# Patient Record
Sex: MALE | Race: WHITE | NOT HISPANIC OR LATINO | Employment: OTHER | URBAN - METROPOLITAN AREA
[De-identification: names, ages, dates, MRNs, and addresses within clinical notes are randomized per-mention and may not be internally consistent; named-entity substitution may affect disease eponyms.]

---

## 2017-08-21 ENCOUNTER — HISTORICAL (OUTPATIENT)
Dept: ADMINISTRATIVE | Facility: HOSPITAL | Age: 72
End: 2017-08-21

## 2017-08-21 ENCOUNTER — HISTORICAL (OUTPATIENT)
Dept: RADIOLOGY | Facility: HOSPITAL | Age: 72
End: 2017-08-21

## 2017-08-21 LAB
ABS NEUT (OLG): 3.76 X10(3)/MCL (ref 2.1–9.2)
ALBUMIN SERPL-MCNC: 4.3 GM/DL (ref 3.4–5)
ALBUMIN/GLOB SERPL: 1.5 {RATIO}
ALP SERPL-CCNC: 62 UNIT/L (ref 50–136)
ALT SERPL-CCNC: 18 UNIT/L (ref 12–78)
APPEARANCE, UA: CLEAR
APTT PPP: 37.4 SECOND(S) (ref 20.6–36)
AST SERPL-CCNC: 12 UNIT/L (ref 15–37)
BACTERIA SPEC CULT: NORMAL /HPF
BASOPHILS # BLD AUTO: 0.1 X10(3)/MCL (ref 0–0.2)
BASOPHILS NFR BLD AUTO: 1 %
BILIRUB SERPL-MCNC: 0.8 MG/DL (ref 0.2–1)
BILIRUB UR QL STRIP: NEGATIVE
BILIRUBIN DIRECT+TOT PNL SERPL-MCNC: 0.1 MG/DL (ref 0–0.2)
BILIRUBIN DIRECT+TOT PNL SERPL-MCNC: 0.7 MG/DL (ref 0–0.8)
BUN SERPL-MCNC: 18 MG/DL (ref 7–18)
CALCIUM SERPL-MCNC: 8.7 MG/DL (ref 8.5–10.1)
CHLORIDE SERPL-SCNC: 105 MMOL/L (ref 98–107)
CO2 SERPL-SCNC: 27 MMOL/L (ref 21–32)
COLOR UR: YELLOW
CREAT SERPL-MCNC: 0.81 MG/DL (ref 0.7–1.3)
EOSINOPHIL # BLD AUTO: 0 X10(3)/MCL (ref 0–0.9)
EOSINOPHIL NFR BLD AUTO: 1 %
ERYTHROCYTE [DISTWIDTH] IN BLOOD BY AUTOMATED COUNT: 12.8 % (ref 11.5–17)
GLOBULIN SER-MCNC: 2.8 GM/DL (ref 2.4–3.5)
GLUCOSE (UA): NEGATIVE
GLUCOSE SERPL-MCNC: 99 MG/DL (ref 74–106)
HCT VFR BLD AUTO: 45 % (ref 42–52)
HGB BLD-MCNC: 15.5 GM/DL (ref 14–18)
HGB UR QL STRIP: NEGATIVE
INR PPP: 1.17 (ref 0–1.27)
KETONES UR QL STRIP: NEGATIVE
LEUKOCYTE ESTERASE UR QL STRIP: NEGATIVE
LYMPHOCYTES # BLD AUTO: 2 X10(3)/MCL (ref 0.6–4.6)
LYMPHOCYTES NFR BLD AUTO: 31 %
MCH RBC QN AUTO: 30.8 PG (ref 27–31)
MCHC RBC AUTO-ENTMCNC: 34.4 GM/DL (ref 33–36)
MCV RBC AUTO: 89.3 FL (ref 80–94)
MONOCYTES # BLD AUTO: 0.6 X10(3)/MCL (ref 0.1–1.3)
MONOCYTES NFR BLD AUTO: 9 %
NEUTROPHILS # BLD AUTO: 3.76 X10(3)/MCL (ref 1.4–7.9)
NEUTROPHILS NFR BLD AUTO: 58 %
NITRITE UR QL STRIP: NEGATIVE
PH UR STRIP: 5 [PH] (ref 5–9)
PLATELET # BLD AUTO: 153 X10(3)/MCL (ref 130–400)
PMV BLD AUTO: 10.2 FL (ref 9.4–12.4)
POTASSIUM SERPL-SCNC: 4.4 MMOL/L (ref 3.5–5.1)
PROT SERPL-MCNC: 7.1 GM/DL (ref 6.4–8.2)
PROT UR QL STRIP: NEGATIVE
PROTHROMBIN TIME: 14.7 SECOND(S) (ref 12.1–14.2)
RBC # BLD AUTO: 5.04 X10(6)/MCL (ref 4.7–6.1)
RBC #/AREA URNS HPF: NORMAL /[HPF]
SODIUM SERPL-SCNC: 137 MMOL/L (ref 136–145)
SP GR UR STRIP: 1.02 (ref 1–1.03)
SQUAMOUS EPITHELIAL, UA: NORMAL
TRANSFERRIN SERPL-MCNC: 273 MG/DL (ref 200–360)
UROBILINOGEN UR STRIP-ACNC: 0.2
WBC # SPEC AUTO: 6.4 X10(3)/MCL (ref 4.5–11.5)
WBC #/AREA URNS HPF: NORMAL /HPF

## 2017-09-25 ENCOUNTER — HISTORICAL (OUTPATIENT)
Dept: ADMINISTRATIVE | Facility: HOSPITAL | Age: 72
End: 2017-09-25

## 2017-11-13 ENCOUNTER — HISTORICAL (OUTPATIENT)
Dept: ADMINISTRATIVE | Facility: HOSPITAL | Age: 72
End: 2017-11-13

## 2018-03-07 ENCOUNTER — HISTORICAL (OUTPATIENT)
Dept: ADMINISTRATIVE | Facility: HOSPITAL | Age: 73
End: 2018-03-07

## 2018-10-03 ENCOUNTER — HISTORICAL (OUTPATIENT)
Dept: ADMINISTRATIVE | Facility: HOSPITAL | Age: 73
End: 2018-10-03

## 2018-10-17 LAB
ABS NEUT (OLG): 3.89 X10(3)/MCL (ref 2.1–9.2)
BASOPHILS # BLD AUTO: 0 X10(3)/MCL (ref 0–0.2)
BASOPHILS NFR BLD AUTO: 1 %
BUN SERPL-MCNC: 16 MG/DL (ref 7–18)
CALCIUM SERPL-MCNC: 8.8 MG/DL (ref 8.5–10.1)
CHLORIDE SERPL-SCNC: 111 MMOL/L (ref 98–107)
CO2 SERPL-SCNC: 25 MMOL/L (ref 21–32)
CREAT SERPL-MCNC: 0.86 MG/DL (ref 0.7–1.3)
CREAT/UREA NIT SERPL: 18.6
EOSINOPHIL # BLD AUTO: 0 X10(3)/MCL (ref 0–0.9)
EOSINOPHIL NFR BLD AUTO: 1 %
ERYTHROCYTE [DISTWIDTH] IN BLOOD BY AUTOMATED COUNT: 12.9 % (ref 11.5–17)
GLUCOSE SERPL-MCNC: 97 MG/DL (ref 74–106)
HCT VFR BLD AUTO: 48.9 % (ref 42–52)
HGB BLD-MCNC: 16.3 GM/DL (ref 14–18)
LYMPHOCYTES # BLD AUTO: 2 X10(3)/MCL (ref 0.6–4.6)
LYMPHOCYTES NFR BLD AUTO: 31 %
MCH RBC QN AUTO: 29.4 PG (ref 27–31)
MCHC RBC AUTO-ENTMCNC: 33.3 GM/DL (ref 33–36)
MCV RBC AUTO: 88.3 FL (ref 80–94)
MONOCYTES # BLD AUTO: 0.6 X10(3)/MCL (ref 0.1–1.3)
MONOCYTES NFR BLD AUTO: 8 %
NEUTROPHILS # BLD AUTO: 3.89 X10(3)/MCL (ref 2.1–9.2)
NEUTROPHILS NFR BLD AUTO: 59 %
PLATELET # BLD AUTO: 179 X10(3)/MCL (ref 130–400)
PMV BLD AUTO: 9.9 FL (ref 9.4–12.4)
POTASSIUM SERPL-SCNC: 4.3 MMOL/L (ref 3.5–5.1)
RBC # BLD AUTO: 5.54 X10(6)/MCL (ref 4.7–6.1)
SODIUM SERPL-SCNC: 142 MMOL/L (ref 136–145)
WBC # SPEC AUTO: 6.6 X10(3)/MCL (ref 4.5–11.5)

## 2018-10-25 ENCOUNTER — HISTORICAL (OUTPATIENT)
Dept: SURGERY | Facility: HOSPITAL | Age: 73
End: 2018-10-25

## 2020-01-22 ENCOUNTER — HISTORICAL (OUTPATIENT)
Dept: ADMINISTRATIVE | Facility: HOSPITAL | Age: 75
End: 2020-01-22

## 2020-03-03 ENCOUNTER — HISTORICAL (OUTPATIENT)
Dept: PREADMISSION TESTING | Facility: HOSPITAL | Age: 75
End: 2020-03-03

## 2020-03-03 LAB
ABS NEUT (OLG): 5.68 X10(3)/MCL (ref 2.1–9.2)
ALBUMIN SERPL-MCNC: 4.1 GM/DL (ref 3.4–5)
ALBUMIN/GLOB SERPL: 1.3 {RATIO}
ALP SERPL-CCNC: 84 UNIT/L (ref 50–136)
ALT SERPL-CCNC: 25 UNIT/L (ref 12–78)
AST SERPL-CCNC: 19 UNIT/L (ref 15–37)
BASOPHILS # BLD AUTO: 0.1 X10(3)/MCL (ref 0–0.2)
BASOPHILS NFR BLD AUTO: 1 %
BILIRUB SERPL-MCNC: 0.4 MG/DL (ref 0.2–1)
BILIRUBIN DIRECT+TOT PNL SERPL-MCNC: 0.1 MG/DL (ref 0–0.2)
BILIRUBIN DIRECT+TOT PNL SERPL-MCNC: 0.3 MG/DL (ref 0–0.8)
BUN SERPL-MCNC: 19 MG/DL (ref 7–18)
CALCIUM SERPL-MCNC: 9.1 MG/DL (ref 8.5–10.1)
CHLORIDE SERPL-SCNC: 104 MMOL/L (ref 98–107)
CO2 SERPL-SCNC: 27 MMOL/L (ref 21–32)
CREAT SERPL-MCNC: 0.79 MG/DL (ref 0.7–1.3)
EOSINOPHIL # BLD AUTO: 0 X10(3)/MCL (ref 0–0.9)
EOSINOPHIL NFR BLD AUTO: 0 %
ERYTHROCYTE [DISTWIDTH] IN BLOOD BY AUTOMATED COUNT: 12.8 % (ref 11.5–17)
GLOBULIN SER-MCNC: 3.2 GM/DL (ref 2.4–3.5)
GLUCOSE SERPL-MCNC: 86 MG/DL (ref 74–106)
HCT VFR BLD AUTO: 45.6 % (ref 42–52)
HGB BLD-MCNC: 15 GM/DL (ref 14–18)
LYMPHOCYTES # BLD AUTO: 0.9 X10(3)/MCL (ref 0.6–4.6)
LYMPHOCYTES NFR BLD AUTO: 12 %
MCH RBC QN AUTO: 29.1 PG (ref 27–31)
MCHC RBC AUTO-ENTMCNC: 32.9 GM/DL (ref 33–36)
MCV RBC AUTO: 88.5 FL (ref 80–94)
MONOCYTES # BLD AUTO: 1 X10(3)/MCL (ref 0.1–1.3)
MONOCYTES NFR BLD AUTO: 13 %
NEUTROPHILS # BLD AUTO: 5.68 X10(3)/MCL (ref 2.1–9.2)
NEUTROPHILS NFR BLD AUTO: 73 %
PLATELET # BLD AUTO: 201 X10(3)/MCL (ref 130–400)
PMV BLD AUTO: 10.2 FL (ref 9.4–12.4)
POTASSIUM SERPL-SCNC: 4.2 MMOL/L (ref 3.5–5.1)
PROT SERPL-MCNC: 7.3 GM/DL (ref 6.4–8.2)
RBC # BLD AUTO: 5.15 X10(6)/MCL (ref 4.7–6.1)
SODIUM SERPL-SCNC: 136 MMOL/L (ref 136–145)
WBC # SPEC AUTO: 7.8 X10(3)/MCL (ref 4.5–11.5)

## 2020-03-18 ENCOUNTER — HISTORICAL (OUTPATIENT)
Dept: SURGERY | Facility: HOSPITAL | Age: 75
End: 2020-03-18

## 2021-06-14 ENCOUNTER — HISTORICAL (OUTPATIENT)
Dept: ADMINISTRATIVE | Facility: HOSPITAL | Age: 76
End: 2021-06-14

## 2021-07-06 ENCOUNTER — HISTORICAL (OUTPATIENT)
Dept: SURGERY | Facility: HOSPITAL | Age: 76
End: 2021-07-06

## 2021-09-13 ENCOUNTER — HISTORICAL (OUTPATIENT)
Dept: ADMINISTRATIVE | Facility: HOSPITAL | Age: 76
End: 2021-09-13

## 2022-04-11 ENCOUNTER — HISTORICAL (OUTPATIENT)
Dept: ADMINISTRATIVE | Facility: HOSPITAL | Age: 77
End: 2022-04-11

## 2022-04-27 VITALS
DIASTOLIC BLOOD PRESSURE: 73 MMHG | SYSTOLIC BLOOD PRESSURE: 125 MMHG | BODY MASS INDEX: 28.37 KG/M2 | WEIGHT: 202.63 LBS | HEIGHT: 71 IN

## 2022-04-30 NOTE — OP NOTE
DATE OF SURGERY:    07/06/2021    SURGEON:  Sky Barnett MD  ASSISTANT:  JOHNATHAN Henderson    ASSISTANT ATTESTATION:  Haider Castro, certified physician assistant, was necessary and essential for all aspects of the operation including patient positioning, surgical exposure, exposure of the transverse carpal ligament and retraction that protected the median nerve, and wound closure.    PREOPERATIVE DIAGNOSIS:  Right hand carpal tunnel syndrome.    POSTOPERATIVE DIAGNOSIS:  Right hand carpal tunnel syndrome.    PROCEDURE:  Right hand carpal tunnel release.    COUNTS:  Lap, needle, and sponge count are correct.    COMPLICATIONS:  None.    BLOOD LOSS:  None.    INDICATIONS:  Mr. Rico is 75 years old with carpal tunnel syndrome of his right hand.  He had failed nonoperative treatment.  Risks, benefits, alternatives, and complications of operative and nonoperative treatment were explained.  He understood, agreed, and wanted to proceed with operative intervention.  Valid informed consent was obtained.    DESCRIPTION OF PROCEDURE:  He was brought to the operating room, placed supine on the operating room table, and underwent regional anesthesia and sedation.  A tourniquet was placed on the right arm.  The usual sterile ChloraPrep scrub and paint followed by sterile draping was performed.  Ioban dressing was placed.  Esmarch bandage was used to exsanguinate the right upper extremity.  Tourniquet was inflated.  Incision was made adjacent to the thenar crease.  Superficial palmar fascia was incised in line with the skin incision.  Skin bleeders were coagulated with cautery.  A self-retaining retractor was placed along with right angle retractors at the proximal and distal end of the skin incision to expose the transverse carpal ligament.  I then released the transverse carpal ligament along its ulnar border in line with the skin incision.  I then released the distal extent of the deep forearm fascia for a complete  release of the carpal tunnel.  The median nerve was intact.  There were no masses or lesions within the carpal tunnel.  The tourniquet was deflated.  Hemostasis was obtained.  There was no abnormal bleeding.  The wound was     irrigated, suctioned, closed with nylon sutures.  Sterile dressings were applied.  Patient was taken to the recovery room in stable condition.        ______________________________  MD GARBIEL Adrian/JIL  DD:  07/06/2021  Time:  09:06AM  DT:  07/06/2021  Time:  09:21AM  Job #:  [id number]

## 2022-04-30 NOTE — OP NOTE
Patient:   Brown Rico Jr             MRN: [id number]            FIN: [id number]               Age:   74 years     Sex:  Male     :  1945   Associated Diagnoses:   Cholelithiasis; Calculous cholecystitis   Author:   Kelvin Silverman MD      Operative Note   Operative Information   Date/ Time:  3/18/2020 08:06:00.     Procedures Performed.     Indications.     Preoperative Diagnosis: Cholelithiasis (MNX86-ID K80.20), Calculous cholecystitis (WWQ10-XH K80.10).     Postoperative Diagnosis: Cholelithiasis (OAX70-MS K80.20), Calculous cholecystitis (VVR38-LX K80.10).     Surgeon: Kelvin Silverman MD.     Assistant: Helen Ellis     Anesthesia: Gen..     Speciman Removed: gallbladder.     Description of Procedure/Findings/    Complications:     The patient was taken to the operating room. Patient was placed under general anesthesia. Abdomen was prepped and draped in the usual sterile fashion. An appropriate timeout was performed. Optical tipped trocar was used to access the peritoneal cavity. Pneumoperitoneum was instituted. Abdominal exploration was negative. Gallbladder was noted and held in a cephalad direction. The infundibulum was noted and held in a lateral direction. The peritoneum overlying the infundibulum was dissected revealing the cystic duct gallbladder junction and the cystic artery. The critical view was obtained. The cystic duct and cystic artery were clipped and transected. The gallbladder was removed from the undersurface of the liver with sharp and electric dissection. Hemostasis was assured. The clips were in excellent position and there was no bleeding or leakage of bile. Gallbladder was completely removed from the liver hemostasis was assured. The gallbladder was removed from the abdomen. Once again hemostasis was assured the operative site was irrigated until clear. Trochars were removed and pneumoperitoneum released the incisions were closed with Vicryl..     Esimated  blood loss: loss less than  15  cc.     Complications: None.

## 2022-04-30 NOTE — OP NOTE
DATE OF SURGERY:    10/25/2018    SURGEON:  Sky Barnett MD  ASSISTANT:  JOHNATHAN Henderson    PREOPERATIVE DIAGNOSIS:  Left hand carpal tunnel syndrome.    POSTOPERATIVE DIAGNOSIS:  Left hand carpal tunnel syndrome.    PROCEDURE:  Left hand carpal tunnel release.    COUNTS:  Lap, needle, and sponge counts were correct.    COMPLICATIONS:  None.    BLOOD LOSS:  None.    INDICATIONS FOR PROCEDURE:  Mr. Rico has left hand carpal tunnel syndrome and elected to undergo a left hand carpal tunnel release after failed nonoperative treatment.  Risks, benefits, alternatives and complications of operative and nonoperative treatment were explained.  He understood, agreed and wanted to proceed with operative intervention.  Valid informed consent was obtained.    PROCEDURE IN DETAIL:  He was brought to the operating room and placed supine on the operating table and underwent general anesthesia.  He was positioned with the left arm on a hand table, followed by placement of a tourniquet.  The left arm tourniquet was positioned, and the usual sterile ChloraPrep scrub and paint followed by sterile draping was performed.  Ioban dressing was placed.  Esmarch bandage was used to exsanguinate the left upper extremity.  Tourniquet was inflated.  Incision was made adjacent to the thenar crease.  The skin was __________ with cautery.  The superficial palmar fascia was incised along the same line as the skin incision.  The transverse carpal ligament was exposed and then incised along its ulnar border.  I then released the distal extent of the deep forearm fascia in line with the incised transverse carpal ligament.  After complete release of the carpal tunnel, the median nerve was inspected and found to be intact.  There were no masses or lesions within the carpal tunnel.  The tourniquet was deflated.  Hemostasis was obtained.  There was no abnormal bleeding.  The wound was irrigated, suctioned and closed with     nylon sutures.   Sterile dressings were applied.  The patient was taken to the recovery room in stable condition.        ______________________________  MD GABRIEL Adrian/SK  DD:  10/25/2018  Time:  02:16PM  DT:  10/25/2018  Time:  02:47PM  Job #:  [id number]

## 2022-05-05 NOTE — HISTORICAL [facility] CERNER
This is a historical note converted from [place]. Formatting and pictures may have been removed.  Please reference Claudia for original formatting and attached multimedia. Chief Complaint  PT C/O BILATERAL WRIST PAIN. PT STATED HIS HANDS GET NUMB. RIGHT WRIST > LEFT.  History of Present Illness  Patient complains of bilateral wrist and hand pain that wakes him up at night.? As it worsens?he loses dexterity.? He has had worsening weakness?with  in both hands.? He complains of numbness in the thumb, index, long, and occasionally?the ring finger of both hands.  Review of Systems  Systemic: No fever, no chills, and no recent weight change.  Head: No headache - frequent.  Eyes: No vision problems.  Otolarnygeal: No hearing loss, no earache, no epistaxis, no hoarseness, and no tooth pain. Gums normal.  Cardiovascular: No chest pain or discomfort and no palpitations.  Pulmonary: No pulmonary symptoms - difficulty sleeping, no dyspnea, and cough not worse in the morning.  Gastrointestinal: Appetite not decreased. No dysphagia and no constant eructation. No nausea, no vomiting, no abdominal pain, no hematochezia, and no loose/mushy stools - frequent. No constipation - frequent.  Genitourinary: No genitourinary symptoms - Getting up every night to urinate and no increase in urinary frequency. No urinary hesitancy. No urinary loss of control - difficulty stopping urination and no burning sensation during urination.  Musculoskeletal: No calf muscle cramps and no localized soft tissue swelling of the ankle.  Neurological: No fainting and no convulsions.  Psychological: Not feeling nervous tension, not feeling nervous from exhaustion, and no depression.  Skin: No rash. Previous history of no ulcers.  ?  Physical Exam  Vitals & Measurements  BP:?137/80?  HT:?180?cm? HT:?180?cm? WT:?94.80?kg? WT:?94.80?kg? BMI:?29.26?  Bilateral hand and wrist exam:  Full range of motion of all 5 fingers in both hands  Thenar atrophy present in  both hands  No hypo-thenar atrophy  Positive Phalens test  Positive carpal compression test  No swelling, redness, no increased heat  Decreased light touch sensation in the thumb, index, and long fingers  Normal sensation?in the ring and?little fingers on exam today  2+ radial pulses  Radiographs of?right and left wrist?show no gross evidence of arthritic change. ?No evidence of fracture or dislocation.? Mild degenerative changes in the carpus.  Assessment/Plan  1.?Right carpal tunnel syndrome  ? Discussed carpal tunnel release  Patient will call if he wants to schedule.  Ordered:  Office/Outpatient Visit Level 3 Established [id number] PC, Right carpal tunnel syndrome  Left carpal tunnel syndrome, [facility], 03/07/18 15:04:00 CST  ?  2.?Left carpal tunnel syndrome  Ordered:  Office/Outpatient Visit Level 3 Established [id number] PC, Right carpal tunnel syndrome  Left carpal tunnel syndrome, [facility], 03/07/18 15:04:00 CST  ?  Bilateral wrist pain  ?  Orders:  Clinic Follow-up PRN, 03/07/18 15:04:00 CST, Future Order, [facility]   Problem List/Past Medical History  Ongoing  Afib  High cholesterol  Hypertension  Irregular heart beat  Primary osteoarthritis of right knee  Right carpal tunnel syndrome  S/P left unicompartmental knee replacement  S/P right unicompartmental knee replacement  Thyroid  Historical  ATRIAL FIBRILLATION  Benign hypertrophy of prostate  Hypothyroid  Procedure/Surgical History  [facility] Partial Knee Arthroplasty Bilateral (Bilateral) (09/12/2017), Replacement of Left Knee Joint with Unicondylar Synthetic Substitute, Cemented, Open Approach (09/12/2017), Replacement of Right Knee Joint with Unicondylar Synthetic Substitute, Cemented, Open Approach (09/12/2017), Robotic Assisted Procedure of Lower Extremity, Open Approach (09/12/2017), Colonoscopy, Kidney.  Medications  amiodarone 200 mg oral Tab, 100 mg, Oral, Daily  aspirin 81 mg oral Delayed Release (EC) tablet,  81 mg= 1 tab(s), Oral, Daily  Co Q-10, 100 mg, Oral, Daily  Colace 100 mg oral capsule, 200 mg= 2 cap(s), Oral, Daily  finasteride 5 mg oral tablet, 5 mg= 1 tab(s), Oral, Daily  levothyroxine 150 mcg (0.15 mg) oral tablet, 150 mcg= 1 tab(s), Oral, Daily  metoprolol succinate 25 mg oral tablet extended release, 25 mg= 1 tab(s), Oral, At Bedtime  metoprolol SUCCINATE 25 mg oral tablet, EXTENDED RELEASE, 1/2 tab, Oral, Daily  PRADAXA 150 MG CAPSULE, 150 mg= 1 cap(s), Oral, BID  pravastatin 40 mg oral tablet, 1 tab, Oral, At Bedtime  SULFAMETH//160MG TB, Oral, BID  tamsulosin 0.4 mg oral capsule, 0.4 mg= 1 cap(s), Oral, Daily  Taztia  mg/24 hours oral capsule, extended release, 240 mg= 1 cap(s), Oral, Daily  Valium 5 mg oral tablet, 5 mg= 1 tab(s), Oral, q6hr, PRN  Vitamin D3 2000 intl units oral capsule, 2000 IntUnit= 1 cap(s), Oral, Daily  vitamin E 1000 intl units oral capsule, 1000 IntUnit= 1 cap(s), Oral, Daily  Allergies  No Known Allergies  Social History  Alcohol - Denies Alcohol Use, 10/12/2013  Never, 08/07/2017  Home/Environment  Lives with Spouse., 09/25/2017  Substance Abuse - Denies Substance Abuse, 10/12/2013  Never, 08/07/2017  Tobacco - Denies Tobacco Use, 10/12/2013  Never smoker, 08/07/2017  Family History  Cancer: Father.  Heart: Father.  Hypertension.: Father.

## 2022-05-05 NOTE — HISTORICAL [facility] CERNER
This is a historical note converted from Claudia. Formatting and pictures may have been removed.  Please reference [place] for original formatting and attached multimedia. Chief Complaint  Patient presents for left knne pain. Reports having intermitten burning behind his left knee. Also reports having intermitten numbness in his fingers, with pain that readiates up towards his shoulder.  History of Present Illness  75-year-old male presents office today for evaluation of his left knee and right?wrist pain.?His left knee pain has been bothering him for couple of months. His pain is noted?through the posterior thigh. He denies any?joint pain. Denies any swelling.?This is?worse when getting from a seated to a standing position. Denies any anterior knee pain.?History of bilateral?medial compartment partial knee arthroplasties in 2017.?Is also complaining of some numbness and tingling to the right?thumb index and long finger.?This is worse at night. He has seen some improvement with a carpal tunnel brace. He has a history of left carpal tunnel release in 2018. Denies any neck pain  Review of Systems  Systemic: No fever, no chills, and no recent weight change.  Head: No headache - frequent.  Eyes: No vision problems.  Otolarnygeal: No hearing loss, no earache, no epistaxis, no hoarseness, and no tooth pain. Gums normal.  Cardiovascular: No chest pain or discomfort and no palpitations.  Pulmonary: No pulmonary symptoms - no dyspnea, no shortness of breath  Gastrointestinal: Appetite not decreased. No dysphagia and no constant eructation. No nausea, no vomiting, no abdominal pain, no hematochezia.  Genitourinary: No genitourinary symptoms - No urinary hesitancy. No urinary loss of control - no burning sensation during urination.  Musculoskeletal: No calf muscle cramps and no localized soft tissue swelling  Neurological: No fainting and no convulsions.  Psychological: no depression.  Skin: No rash.  Physical Exam  Vitals &  Measurements  T:?97.4? ?F (Oral)? HR:?59(Peripheral)? BP:?136/82? WT:?94.430?kg? BMI:?29.15?  General exam:  Well-groomed,?well-nourished, no acute distress  Alert and oriented ?3  HEENT: PERRLA,?normocephalic, atraumatic  Cardiovascular: S1 and S2 heard,?no murmurs  Pulmonary: Lungs clear to auscultation?bilaterally  Gastrointestinal: Positive bowel sounds,?soft, nontender  ?   Cardiovascular:  Arterial Pulses: ? Dorsalis pedis pulses were normal.  Musculoskeletal System:  left Knee:  General: ? No swelling of the knee. ? No warmth of the knee. ? No pain was elicited by motion of the knee. ? No instability of the knee. ? Knees demonstrated no muscle weakness.  tender over mid hamstring over biceps femoris  left?Knee: ? Motion was normal.  Active flexion?120 degrees  Active extension 0 degrees  Neurological:  Sensation: ? Monofilament wire test of the leg/foot was normal.  Motor (Strength): ? No weakness of the?left knee was observed.  Skin:  ? No cellulitis. Surgical incision well healed ?  Tests  Imaging:  X-Ray Knee:  A complete knee x-ray with standing views was performed -of?left knee.  Impressions Radiology Test  X-ray of knee was performed intact?left knee implant.?No signs of loosening or subsidence  ?   right wrist exam:  No swelling or erythema  Positive carpal compression test  Positive Tinels sign  Full range of motion?through all digits as well as the wrist  Sensation intact distally  Radial pulse 2+  Assessment/Plan  1.?History of unicondylar arthroplasty of left knee?Z96.652  ?Recommend to physical therapy for hamstring?tightness. Follow-up with us as needed  Ordered:  Clinic Follow-up PRN, 06/14/21 9:33:00 CDT, Future Order, [facility]  ?  2.?Hamstring tightness of left lower extremity?M62.89  ?Physical?therapy  Ordered:  Clinic Follow-up PRN, 06/14/21 9:33:00 CDT, Future Order, [facility]  ?  3.?Right carpal tunnel syndrome?G56.01  ?Right hand carpal tunnel release  The risks, benefits, and  alternatives to surgery were discussed with the patient and family and they wish to proceed with the operation.  Dr. Cortés has examined the patient and agrees with plan  Ordered:  Clinic Follow-up PRN, 06/14/21 9:33:00 CDT, Future Order, [facility]  ?   Problem List/Past Medical History  Ongoing  Afib  BPH - benign prostatic hyperplasia  Gallstone  GERD - Gastro-esophageal reflux disease  Hamstring tightness of left lower extremity  High cholesterol  History of unicondylar arthroplasty of left knee  Home CPAP unit  Hypertension  Irregular heart beat  Primary osteoarthritis of right knee  Right carpal tunnel syndrome  S/P carpal tunnel release  S/P left unicompartmental knee replacement  S/P right unicompartmental knee replacement  Sleep apnea  Thyroid  Historical  ATRIAL FIBRILLATION  Benign hypertrophy of prostate  Hypothyroid  Procedure/Surgical History  Ablation (08/24/2020)  Cholecystectomy Laparoscopic (None) (03/18/2020)  Laparoscopy, surgical; cholecystectomy (03/18/2020)  Resection of Gallbladder, Percutaneous Endoscopic Approach (03/18/2020)  Carpal Tunnel Release (Left) (10/25/2018)  Neuroplasty and/or transposition; median nerve at carpal tunnel (10/25/2018)  Release Median Nerve, Open Approach (10/25/2018)  [facility] Partial Knee Arthroplasty Bilateral (Bilateral) (09/12/2017)  Replacement of Left Knee Joint with Unicondylar Synthetic Substitute, Cemented, Open Approach (09/12/2017)  Replacement of Right Knee Joint with Unicondylar Synthetic Substitute, Cemented, Open Approach (09/12/2017)  Robotic Assisted Procedure of Lower Extremity, Open Approach (09/12/2017)  angiogram  Colonoscopy  hemorrhoidectomy  Kidney   Medications  Inpatient  Toradol, 30 mg= 1 mL, IV Push, Once  Home  diltiazem 240 mg/24 hours oral CAPsule, extended release, 240 mg= 1 cap(s), Oral, Daily  finasteride 5 mg oral tablet, 5 mg= 1 tab(s), Oral, Daily  flecainide 100 mg oral tablet, 100 mg= 1 tab(s), Oral, BID  levothyroxine  150 mcg (0.15 mg) oral tablet, 150 mcg= 1 tab(s), Oral, Daily  PRADAXA 150 MG CAPSULE, 150 mg= 1 cap(s), Oral, BID  pravastatin 40 mg oral tablet, 40 mg= 1 tab(s), Oral, At Bedtime  tamsulosin 0.4 mg oral capsule, 0.4 mg= 1 cap(s), Oral, Daily  Allergies  No Known Allergies  Social History  Abuse/Neglect  No, No, Yes, 06/14/2021  Alcohol - Denies Alcohol Use, 10/12/2013  Past, 06/14/2021  Home/Environment  Lives with Spouse., 09/25/2017  Spiritual/Cultural  Buddhism, 03/03/2020  Substance Use - Denies Substance Abuse, 10/12/2013  Never, 01/22/2020  Tobacco - Denies Tobacco Use, 10/12/2013  Never (less than 100 in lifetime), No, 06/14/2021  Family History  Cancer: Father.  Heart: Father.  Hypertension.: Father.

## 2022-05-05 NOTE — HISTORICAL [facility]
This is a historical note converted from Claudia. Formatting and pictures may have been removed.  Please reference Claudia for original formatting and attached multimedia. Chief Complaint  right CTS, ready to schedule surgery  History of Present Illness  Patient has numbness and pain?in his right hand.? The numbness wakes him up at night. ?He has pain and numbness present primarily in the thumb, index, and long fingers. ?He has no?little or ring finger?tenderness or numbness.  Review of Systems  Systemic: No fever, no chills, and no recent weight change.  Head: No headache - frequent.  Eyes: No vision problems.  Otolarnygeal: No hearing loss, no earache, no epistaxis, no hoarseness, and no tooth pain. Gums normal.  Cardiovascular: No chest pain or discomfort and no palpitations.  Pulmonary: No pulmonary symptoms - difficulty sleeping, no dyspnea, and cough not worse in the morning.  Gastrointestinal: Appetite not decreased. No dysphagia and no constant eructation. No nausea, no vomiting, , no hematochezia, and no loose/mushy stools - frequent. No constipation - frequent.? Patient has been having abdominal pain and is scheduled?next Thursday to see Dr. Potts.  Genitourinary: No genitourinary symptoms - Getting up every night to urinate and no increase in urinary frequency. No urinary hesitancy. No urinary loss of control - difficulty stopping urination and no burning sensation during urination.  Musculoskeletal: No calf muscle cramps and no localized soft tissue swelling of the ankle.  Neurological: No fainting and no convulsions.  Psychological: Not feeling nervous tension, not feeling nervous from exhaustion, and no depression.  Skin: No rash. Previous history of no ulcers.  Physical Exam  Vitals & Measurements  HR:?61(Peripheral)? BP:?138/82?  HT:?180?cm? WT:?91.1?kg? BMI:?28.12?  Right upper extremity;  No tenderness?of the ulnar groove or ulnar nerve  Decreased light touch sensation in the thumb, index, and long  fingers  2+ radial pulse  Full range of motion of all 5 fingers  Mild thenar atrophy  Positive Phalens?test  Positive carpal compression test  ?  Assessment/Plan  1.?Right carpal tunnel syndrome?G56.01  ?Patient is scheduled to see Dr. Potts for his abdominal pain next week.? He wants to schedule his right carpal tunnel release?from March?as long as everything is okay with his abdomen.  Ordered:  Office/Outpatient Visit Level 3 Established [id number] PC, Right carpal tunnel syndrome, [facility], 01/22/20 9:53:00 CST  ?  Orders:  Clinic Follow-up PRN, 01/22/20 9:53:00 CST, Future Order, [facility]  XR Hand Right Minimum 3 Views, Routine, 01/22/20 9:34:00 CST, Pain, None, Ambulatory, Patient Has IV?, Rad Type, Right hand pain, Not Scheduled, 01/22/20 9:34:00 CST  Referrals  Clinic Follow-up PRN, 01/22/20 9:53:00 CST, Future Order, [facility]   Problem List/Past Medical History  Ongoing  Afib  High cholesterol  Hypertension  Irregular heart beat  Primary osteoarthritis of right knee  Right carpal tunnel syndrome  S/P carpal tunnel release  S/P left unicompartmental knee replacement  S/P right unicompartmental knee replacement  Thyroid  Historical  ATRIAL FIBRILLATION  Benign hypertrophy of prostate  Hypothyroid  Procedure/Surgical History  Carpal Tunnel Release (Left) (10/25/2018)  Neuroplasty and/or transposition; median nerve at carpal tunnel (10/25/2018)  Release Median Nerve, Open Approach (10/25/2018)  [facility] Partial Knee Arthroplasty Bilateral (Bilateral) (09/12/2017)  Replacement of Left Knee Joint with Unicondylar Synthetic Substitute, Cemented, Open Approach (09/12/2017)  Replacement of Right Knee Joint with Unicondylar Synthetic Substitute, Cemented, Open Approach (09/12/2017)  Robotic Assisted Procedure of Lower Extremity, Open Approach (09/12/2017)  angiogram  Colonoscopy  Kidney   Medications  amiodarone 200 mg oral Tab, 100 mg, Oral, Daily,? ?Not Taking per Prescriber: Last Dose  Date/Time Unknown  aspirin 81 mg oral Delayed Release (EC) tablet, 81 mg= 1 tab(s), Oral, Daily,? ?Not Taking per Prescriber: Last Dose Date/Time Unknown  Co Q-10, 100 mg, Oral, Daily  Colace 100 mg oral capsule, 200 mg= 2 cap(s), Oral, Daily,? ?Not Taking per Prescriber: Last Dose Date/Time Unknown  finasteride 5 mg oral tablet, 5 mg= 1 tab(s), Oral, Daily  FLECAINIDE 50MG TABLETS  levothyroxine 150 mcg (0.15 mg) oral tablet, 150 mcg= 1 tab(s), Oral, Daily  metoprolol succinate 25 mg oral tablet extended release, 25 mg= 1 tab(s), Oral, At Bedtime,? ?Not Taking per Prescriber: Last Dose Date/Time Unknown  metoprolol SUCCINATE 25 mg oral tablet, EXTENDED RELEASE, 1/2 tab, Oral, Daily,? ?Not Taking per Prescriber: Last Dose Date/Time Unknown  PRADAXA 150 MG CAPSULE, 150 mg= 1 cap(s), Oral, BID  pravastatin 40 mg oral tablet, 1 tab, Oral, At Bedtime  SULFAMETH//160MG TB, Oral, BID  tamsulosin 0.4 mg oral capsule, 0.4 mg= 1 cap(s), Oral, Daily  Taztia  mg/24 hours oral capsule, extended release, 240 mg= 1 cap(s), Oral, Daily  Valium 5 mg oral tablet, 5 mg= 1 tab(s), Oral, q6hr, PRN,? ?Not Taking per Prescriber: Last Dose Date/Time Unknown  Vitamin D3 2000 intl units oral capsule, 2000 IntUnit= 1 cap(s), Oral, Daily  vitamin E 1000 intl units oral capsule, 1000 IntUnit= 1 cap(s), Oral, Daily  Allergies  No Known Allergies  Social History  Abuse/Neglect  No, 01/22/2020  Alcohol - Denies Alcohol Use, 10/12/2013  Never, 01/22/2020  Home/Environment  Lives with Spouse., 09/25/2017  Substance Use - Denies Substance Abuse, 10/12/2013  Never, 01/22/2020  Tobacco - Denies Tobacco Use, 10/12/2013  Never (less than 100 in lifetime), N/A, 01/22/2020  Family History  Cancer: Father.  Heart: Father.  Hypertension.: Father.  Health Maintenance  Health Maintenance  ???Pending?(in the next year)  ??? ??OverDue  ??? ? ? ?Aspirin Therapy for CVD Prevention due??09/13/18??and every 1??year(s)  ??? ? ? ?Hypertension  Management-BMP due??10/17/19??and every 1??year(s)  ??? ? ? ?Advance Directive due??01/01/20??and every 1??year(s)  ??? ? ? ?Geriatric Depression Screening due??01/01/20??and every 1??year(s)  ??? ??Due?  ??? ? ? ?Cognitive Screening due??01/01/20??and every 1??year(s)  ??? ? ? ?Functional Assessment due??01/01/20??and every 1??year(s)  ??? ? ? ?ADL Screening due??01/22/20??and every 1??year(s)  ??? ? ? ?Colorectal Screening (Senior Wellness) due??01/22/20??and every 10??year(s)  ??? ? ? ?Hypertension Management-Education due??01/22/20??and every 1??year(s)  ??? ? ? ?Pneumococcal Vaccine due??01/22/20??Variable frequency  ??? ? ? ?Pneumococcal Vaccine due??01/22/20??and every?  ??? ? ? ?Tetanus Vaccine due??01/22/20??and every 10??year(s)  ??? ? ? ?Zoster Vaccine due??01/22/20??and every 100??year(s)  ??? ??Due In Future?  ??? ? ? ?Alcohol Misuse Screening not due until??01/01/21??and every 1??year(s)  ??? ? ? ?Fall Risk Assessment not due until??01/01/21??and every 1??year(s)  ??? ? ? ?Obesity Screening not due until??01/01/21??and every 1??year(s)  ??? ? ? ?Hypertension Management-Blood Pressure not due until??01/21/21??and every 1??year(s)  ???Satisfied?(in the past 1 year)  ??? ??Satisfied?  ??? ? ? ?Alcohol Misuse Screening on??01/22/20.??Satisfied by Shirley Salgado  ??? ? ? ?Blood Pressure Screening on??01/22/20.??Satisfied by Shirley Salgado.  ??? ? ? ?Body Mass Index Check on??01/22/20.??Satisfied by Shirley Salgado  ??? ? ? ?Fall Risk Assessment on??01/22/20.??Satisfied by Shirley Salgado  ??? ? ? ?Hypertension Management-Blood Pressure on??01/22/20.??Satisfied by Shirley Salgado  ??? ? ? ?Obesity Screening on??01/22/20.??Satisfied by Shirley Salgado  ?

## 2022-05-05 NOTE — HISTORICAL [facility]
This is a historical note converted from [employer]. Formatting and pictures may have been removed.  Please reference Cerarianna for original formatting and attached multimedia. Chief Complaint  Patient presents for follow up right wrist pain and numbness, hx right CTR 7/6/21. Patient denies any pain at this time. Reports occasional pain and numbness. Mainly when gripping objects such as phone, wheel, etc.  History of Present Illness  76-year-old male presents office today for evaluation of his right?wrist pain and numbness into his long finger. ?He had a carpal tunnel release 2 months ago.? He also describes?pain radiating from the?shoulder down the arm. ?He has intermittent neck pain.??His symptoms worsen?with certain positions?such as flexion?of the elbow?for longer periods of time. ?He denies any history of injury  Review of Systems  Systemic: No fever, no chills, and no recent weight change.  Head: No headache - frequent.  Eyes: No vision problems.  Otolarnygeal: No hearing loss, no earache, no epistaxis, no hoarseness, and no tooth pain. Gums normal.  Cardiovascular: No chest pain or discomfort and no palpitations.  Pulmonary: No pulmonary symptoms - no dyspnea, no shortness of breath  Gastrointestinal: Appetite not decreased. No dysphagia and no constant eructation. No nausea, no vomiting, no abdominal pain, no hematochezia.  Genitourinary: No genitourinary symptoms - No urinary hesitancy. No urinary loss of control - no burning sensation during urination.  Musculoskeletal: No calf muscle cramps and no localized soft tissue swelling  Neurological: No fainting and no convulsions.  Psychological: no depression.  Skin: No rash.  Physical Exam  Vitals & Measurements  HR:?61(Peripheral)? BP:?125/73?  HT:?180.00?cm? WT:?91.900?kg? BMI:?28.36?  right hand exam  no swelling or erythema  well healed scar  intact ROM thru all digits pain free  decreased sensation to light touch tip of middle finger  radial pulses  2+  ?  Cervical exam  No tenderness over the spinous process  He does have some mild tenderness over the paravertebral musculature in the right side  Positive Spurling sign right side  Intact range of motion?in flexion, extension, lateral?rotation and bending  DTRs intact and symmetrical  ?  Cervical radiographs taken office today show?loss?of lordosis. ?Multilevel?disc degeneration seen from C3-C7?with anterior osteophytes  Assessment/Plan  1.?Cervical disc disease?M50.90  ?physical therapy  f/u as needed  ?  2.?Cervical radiculopathy?M54.12  ?  3.?S/P carpal tunnel release?Z98.890  Ordered:  Post-Op follow-up visit [id number] PC, S/P carpal tunnel release, [facility], 09/13/21 14:25:00 CDT  XR Spine Cervical 2 or 3 Views, Routine, 09/13/21 14:28:00 CDT, None, Ambulatory, Patient Has IV?, Rad Type, S/P carpal tunnel release, Not Scheduled, 09/13/21 14:28:00 CDT  ?  4.?History of unicondylar arthroplasty of left knee?Z96.652  ?  Referrals  PT/OT Ambulatory Referral, Specialty: Physical Therapy, Start: 09/13/21 14:54:00 CDT, 4, Evaluate and Treat  Modalities  Neck Program  Stretch and Strengthen  Therapeutic Excercise, Instructions: ****General PT Orders****, Cervical disc disease  Cervical radiculopathy  S/...  PT/OT Ambulatory Referral, Specialty: Physical Therapy, Start: 09/13/21 14:56:00 CDT, 4, Evaluate and Treat  Neck Program  Modalities  Stretch and Strengthen  Therapeutic Excercise, Instructions: ****General PT Orders****, Cervical disc disease  Cervical radiculopathy  S/...   Problem List/Past Medical History  Ongoing  Afib  BPH - benign prostatic hyperplasia  Cervical disc disease  Cervical radiculopathy  Gallstone  GERD - Gastro-esophageal reflux disease  Hamstring tightness of left lower extremity  High cholesterol  History of unicondylar arthroplasty of left knee  Home CPAP unit  Hypertension  Irregular heart beat  Primary osteoarthritis of right knee  Right carpal tunnel syndrome  S/P  carpal tunnel release  S/P left unicompartmental knee replacement  S/P right unicompartmental knee replacement  Sleep apnea  Thyroid  Historical  ATRIAL FIBRILLATION  Benign hypertrophy of prostate  Hypothyroid  Procedure/Surgical History  Carpal Tunnel Release (Right) (07/06/2021)  Neuroplasty and/or transposition; median nerve at carpal tunnel (07/06/2021)  Release Median Nerve, Open Approach (07/06/2021)  Ablation (08/24/2020)  Cholecystectomy Laparoscopic (None) (03/18/2020)  Laparoscopy, surgical; cholecystectomy (03/18/2020)  Resection of Gallbladder, Percutaneous Endoscopic Approach (03/18/2020)  Carpal Tunnel Release (Left) (10/25/2018)  Neuroplasty and/or transposition; median nerve at carpal tunnel (10/25/2018)  Release Median Nerve, Open Approach (10/25/2018)  [facility] Partial Knee Arthroplasty Bilateral (Bilateral) (09/12/2017)  Replacement of Left Knee Joint with Unicondylar Synthetic Substitute, Cemented, Open Approach (09/12/2017)  Replacement of Right Knee Joint with Unicondylar Synthetic Substitute, Cemented, Open Approach (09/12/2017)  Robotic Assisted Procedure of Lower Extremity, Open Approach (09/12/2017)  angiogram  Colonoscopy  hemorrhoidectomy  Kidney   Medications  acetaminophen-hydrocodone 325 mg-7.5 mg oral tablet, 1 tab(s), Oral, TID,? ?Not taking  diltiazem 240 mg/24 hours oral CAPsule, extended release, 240 mg= 1 cap(s), Oral, Daily  finasteride 5 mg oral tablet, 5 mg= 1 tab(s), Oral, Daily  flecainide 100 mg oral tablet, 100 mg= 1 tab(s), Oral, BID  levothyroxine 150 mcg (0.15 mg) oral tablet, 150 mcg= 1 tab(s), Oral, Daily  PRADAXA 150 MG CAPSULE, 150 mg= 1 cap(s), Oral, BID  pravastatin 40 mg oral tablet, 40 mg= 1 tab(s), Oral, At Bedtime  tamsulosin 0.4 mg oral capsule, 0.4 mg= 1 cap(s), Oral, Daily  Toradol, 30 mg= 1 mL, IV Push, Once  Allergies  No Known Allergies  Social History  Abuse/Neglect  No, No, Yes, 09/13/2021  Alcohol - Denies Alcohol Use, 10/12/2013  Past,  09/13/2021  Home/Environment  Lives with Spouse., 09/25/2017  Spiritual/Cultural  Hoahaoism, 03/03/2020  Substance Use - Denies Substance Abuse, 10/12/2013  Never, 07/21/2021  Tobacco - Denies Tobacco Use, 10/12/2013  Never (less than 100 in lifetime), No, 09/13/2021  Family History  Cancer: Father.  Heart: Father.  Hypertension.: Father.  Immunizations  Vaccine Date Status   COVID-19 MRNA, LNP-S, PF- Pfizer 02/24/2021 Recorded   COVID-19 MRNA, LNP-S, PF- Pfizer 01/27/2021 Recorded   Health Maintenance  Health Maintenance  ???Pending?(in the next year)  ??? ??OverDue  ??? ? ? ?Aspirin Therapy for CVD Prevention due??09/13/18??and every 1??year(s)  ??? ? ? ?Alcohol Misuse Screening due??01/02/21??and every 1??year(s)  ??? ? ? ?Cognitive Screening due??01/02/21??and every 1??year(s)  ??? ? ? ?Hypertension Management-BMP due??03/03/21??and every 1??year(s)  ??? ??Due?  ??? ? ? ?ADL Screening due??09/13/21??and every 1??year(s)  ??? ? ? ?Colorectal Screening due??09/13/21??and every 10??year(s)  ??? ? ? ?Hypertension Management-Education due??09/13/21??and every 1??year(s)  ??? ? ? ?Medicare Annual Wellness Exam due??09/13/21??and every 1??year(s)  ??? ? ? ?Pneumococcal Vaccine due??09/13/21??Unknown Frequency  ??? ? ? ?Tetanus Vaccine due??09/13/21??and every 10??year(s)  ??? ? ? ?Zoster Vaccine due??09/13/21??Unknown Frequency  ??? ??Due In Future?  ??? ? ? ?Obesity Screening not due until??01/01/22??and every 1??year(s)  ??? ? ? ?Advance Directive not due until??01/02/22??and every 1??year(s)  ??? ? ? ?Fall Risk Assessment not due until??01/02/22??and every 1??year(s)  ??? ? ? ?Functional Assessment not due until??01/02/22??and every 1??year(s)  ??? ? ? ?Blood Pressure Screening not due until??07/21/22??and every 1??year(s)  ??? ? ? ?Body Mass Index Check not due until??07/21/22??and every 1??year(s)  ??? ? ? ?Depression Screening not due until??07/21/22??and every 1??year(s)  ??? ? ? ?Hypertension Management-Blood  Pressure not due until??07/21/22??and every 1??year(s)  ???Satisfied?(in the past 1 year)  ??? ??Satisfied?  ??? ? ? ?Advance Directive on??07/06/21.??Satisfied by Tracey Stanley RN  ??? ? ? ?Blood Pressure Screening on??09/13/21.??Satisfied by Tracey Hutchison  ??? ? ? ?Body Mass Index Check on??09/13/21.??Satisfied by Tracey Hutchison  ??? ? ? ?Depression Screening on??09/13/21.??Satisfied by Tracey Hutchison  ??? ? ? ?Fall Risk Assessment on??09/13/21.??Satisfied by Tracey Hutchison  ??? ? ? ?Functional Assessment on??07/06/21.??Satisfied by Tracey Stanley RN  ??? ? ? ?Hypertension Management-Blood Pressure on??09/13/21.??Satisfied by Tracey Hutchison  ??? ? ? ?Obesity Screening on??09/13/21.??Satisfied by Tracey Hutchison  ?

## 2022-05-05 NOTE — HISTORICAL [facility]
This is a historical note converted from Claudia. Formatting and pictures may have been removed.  Please reference Claudia for original formatting and attached multimedia. Chief Complaint  S/P bilateral uni knees on 9/12/17. Doing very well walking with cane.  History of Present Illness  2 weeks status post?bilateral medial compartment partial knee arthroplasties  He is doing great, pain is controlled, working well with therapy  He is currently ambulatory with a cane  Review of Systems  Systemic: No fever, no chills, and no recent weight change.  Head: No headache - frequent.  Eyes: No vision problems.  Otolarnygeal: No hearing loss, no earache, no epistaxis, no hoarseness, and no tooth pain. Gums normal.  Cardiovascular: No chest pain or discomfort and no palpitations.  Pulmonary: No pulmonary symptoms - difficulty sleeping, no dyspnea, and cough not worse in the morning.  Gastrointestinal: Appetite not decreased. No dysphagia and no constant eructation. No nausea, no vomiting, no abdominal pain, no hematochezia, and no loose/mushy stools - frequent. No constipation - frequent.  Genitourinary: No genitourinary symptoms - Getting up every night to urinate and no increase in urinary frequency. No urinary hesitancy. No urinary loss of control - difficulty stopping urination and no burning sensation during urination.  Musculoskeletal: No calf muscle cramps and no localized soft tissue swelling of the ankle.  Neurological: No fainting and no convulsions.  Psychological: Not feeling nervous tension, not feeling nervous from exhaustion, and no depression.  Skin: No rash. Previous history of no ulcers.  ?  ?  Physical Exam  Vitals & Measurements  BP:?168/82?  HT:?180?cm? HT:?180?cm? WT:?94.80?kg? WT:?94.80?kg? BMI:?29.26?  Musculoskeletal System:  Right Knee:  General/bilateral:???Swelling of the knee.???Knee demonstrated muscle weakness.???No warmth of the knee.???No pain was elicited by motion of the knee.???No instability  of the knee  Right Knee:  Knee Motion: Value  Active flexion 125 degrees  Active extension 0 degrees  ?   Neurological:  Motor (Strength):???Weakness of the Right knee was observed.  Skin:  ??No cellulitis.  Wound healing normal. staples C/D/I.  ?  ?   TESTS  Imaging:  X-Ray Knee:  AP and lateral view x-rays of the Right knee with sunrise view of the patella were performed  ?   IMPRESSIONS RADIOLOGY TEST  X-ray of knee was performed intact Right knee implant.  ?  ?  ?  Musculoskeletal System:  Left Knee:  General/bilateral:???Swelling of the knee.???Knee demonstrated muscle weakness.???No warmth of the knee.???No pain was elicited by motion of the knee.???No instability of the knee  Left Knee:  Knee Motion: Value  Active flexion?125 degrees  Active extension 0 degrees  ?   Neurological:  Motor (Strength):???Weakness of the Left knee was observed.  Skin:  ??No cellulitis.  Wound healing normal. staples C/D/I.  ?  ?   TESTS  Imaging:  X-Ray Knee:  AP and lateral view x-rays of the Left knee with sunrise view of the patella were performed  ?   IMPRESSIONS RADIOLOGY TEST  X-ray of knee was performed intact Left knee implant.  ?  ?  ?  ?  Assessment/Plan  S/P bilateral unicompartmental knee replacement  ? Staples removed today, Steri-Strips applied.?He will continue with the use of his cane and continue physical therapy. [facility] follow-up in 3 months for repeat evaluation.?Dr. Barnett has examined patient agrees with plan  Ordered:  Clinic Follow up, *Est. 12/25/17 3:00:00 CST, Order for future visit, S/P bilateral unicompartmental knee replacement, [facility]  Post-Op follow-up visit [id number] PC, S/P bilateral unicompartmental knee replacement, [facility], 09/25/17 14:09:00 CDT  PT/OT External Referral, 09/25/17 14:09:00 CDT, S/P bilateral unicompartmental knee replacement, Anit Grav Hip Abductor & Extensor Exc.  Isotonic hamstring & Closed Chain Quad  Stretch and Strengthen  No Dry Needling   Therapeutic Excercise, 3 X Week, Patient has IV, Stand...  ?   Problem List/Past Medical History  Ongoing  Afib  High cholesterol  Hypertension  Irregular heart beat  Primary osteoarthritis of right knee  Thyroid  Historical  ATRIAL FIBRILLATION  Benign hypertrophy of prostate  Hypothyroid  Procedure/Surgical History  [facility] Partial Knee Arthroplasty Bilateral (Bilateral) (09/12/2017)  Replacement of Left Knee Joint with Unicondylar Synthetic Substitute, Cemented, Open Approach (09/12/2017)  Replacement of Right Knee Joint with Unicondylar Synthetic Substitute, Cemented, Open Approach (09/12/2017)  Robotic Assisted Procedure of Lower Extremity, Open Approach (09/12/2017)  Colonoscopy  Kidney  Medications  amiodarone 200 mg oral Tab, 100 mg, Oral, Daily  aspirin 81 mg oral Delayed Release (EC) tablet, 81 mg= 1 tab(s), Oral, Daily  Co Q-10, 100 mg, Oral, Daily  Colace 100 mg oral capsule, 200 mg= 2 cap(s), Oral, Daily  finasteride 5 mg oral tablet, 5 mg= 1 tab(s), Oral, Daily  levothyroxine 150 mcg (0.15 mg) oral tablet, 150 mcg= 1 tab(s), Oral, Daily  metoprolol succinate 25 mg oral tablet extended release, 25 mg= 1 tab(s), Oral, At Bedtime  metoprolol SUCCINATE 25 mg oral tablet, EXTENDED RELEASE, 1/2 tab, Oral, Daily  Percocet 5/325 oral tablet, 1-2 tab(s), Oral, q4hr, PRN  PRADAXA 150 MG CAPSULE, 150 mg= 1 cap(s), Oral, BID  pravastatin 40 mg oral tablet, 1 tab, Oral, At Bedtime  SULFAMETH//160MG TB, Oral, BID  tamsulosin 0.4 mg oral capsule, 0.4 mg= 1 cap(s), Oral, Daily  Taztia  mg/24 hours oral capsule, extended release, 240 mg= 1 cap(s), Oral, Daily  Valium 5 mg oral tablet, 5 mg= 1 tab(s), Oral, q6hr, PRN  Vitamin D3 2000 intl units oral capsule, 2000 IntUnit= 1 cap(s), Oral, Daily  vitamin E 1000 intl units oral capsule, 1000 IntUnit= 1 cap(s), Oral, Daily  Allergies  No Known Allergies  Social History  Alcohol - Denies Alcohol Use, 09/25/2017  Never  Home/Environment - 09/25/2017  Lives with  Spouse.  Substance Abuse - Denies Substance Abuse, 09/25/2017  Never  Tobacco - Denies Tobacco Use, 09/25/2017  Never smoker  Family History  Cancer: Father.  Heart: Father.  Hypertension.: Father.

## 2022-05-05 NOTE — HISTORICAL [facility] C[facility]
This is a historical note converted from [place]. Formatting and pictures may have been removed.  Please reference [place] for original formatting and attached multimedia. Chief Complaint  pt is here for one year follow up bilateral pka. Pt c/o pain when he transitions from sitting to standing.  History of Present Illness  Patient is here 1 year postop from bilateral medial compartment partial knee arthroplasties. ?He is doing well and only has occasional tenderness when he rises from seated to a standing position. ?Overall, other than this he has no pain. ?He has pain in both hands. ?His hands frequently fall asleep he has to shake them to come alive. ?His?numbness occurs in the thumb, index, and long fingers of both hands.? He feels as if he is losing dexterity secondary to carpal tunnel syndrome.  Review of Systems  Systemic: No fever, no chills, and no recent weight change.  Head: No headache - frequent.  Eyes: No vision problems.  Otolarnygeal: No hearing loss, no earache, no epistaxis, no hoarseness, and no tooth pain. Gums normal.  Cardiovascular: No chest pain or discomfort and no palpitations.  Pulmonary: No pulmonary symptoms - difficulty sleeping, no dyspnea, and cough not worse in the morning.  Gastrointestinal: Appetite not decreased. No dysphagia and no constant eructation. No nausea, no vomiting, no abdominal pain, no hematochezia, and no loose/mushy stools - frequent. No constipation - frequent.  Genitourinary: No genitourinary symptoms - Getting up every night to urinate and no increase in urinary frequency. No urinary hesitancy. No urinary loss of control - difficulty stopping urination and no burning sensation during urination.  Musculoskeletal: No calf muscle cramps and no localized soft tissue swelling of the ankle.  Neurological: No fainting and no convulsions.  Psychological: Not feeling nervous tension, not feeling nervous from exhaustion, and no depression.  Skin: No rash. Previous history of  no ulcers.  Physical Exam  Vitals & Measurements  HT:?180?cm? HT:?180?cm? WT:?94.80?kg? WT:?94.80?kg? BMI:?29.26?  General: Alert and oriented, No acute distress.  Eye: Pupils are equal, round and reactive to light, Extraocular movements are intact.  HENT: Normocephalic.  Neck: Supple, Non-tender.  Respiratory: Lungs are clear to auscultation, Respirations are non-labored, Breath sounds are equal.  Cardiovascular: Normal rate, Regular rhythm, No murmur, No gallop, Good pulses equal in all extremities.  Gastrointestinal: Soft, Non-tender, Non-distended.  Genitourinary: No costovertebral angle tenderness.  Integumentary: Dry, Intact, No pallor.  Neurologic: Alert, Oriented, Normal sensory, Normal motor function, No focal deficits.  Psychiatric: Cooperative, Appropriate mood & affect, Normal judgment.  Musculoskeletal: _  ?  Cardiovascular:  Arterial Pulses: ? Dorsalis pedis pulses were normal.  Musculoskeletal System:  Right Knee:  General: ? No swelling of the knee. ? No warmth of the knee. ? No pain was elicited by motion of the knee. ? No instability of the knee. ? Knees demonstrated no muscle weakness.  Right Knee: ? Motion was normal.  Active flexion 135_ degrees  Active extension 0 degrees  Neurological:  Sensation: ? Monofilament wire test of the leg/foot was normal.  Motor (Strength): ? No weakness of the right knee was observed.  Skin:  ? No cellulitis. Surgical incision well healed ?  Tests  Imaging:  X-Ray Knee:  A complete knee x-ray with standing views was performed -of right knee.  Impressions Radiology Test  X-ray of knee was performed intact right knee implant.  ?  ?  Cardiovascular:  Arterial Pulses: ? Dorsalis pedis pulses were normal.  Musculoskeletal System:  Left Knee:  General: ? No swelling of the knee. ? No warmth of the knee. ? No pain was elicited by motion of the knee. ? No instability of the knee. ? Knees demonstrated no muscle weakness.  Left Knee: ? Motion was normal.  Active flexion 135_  degrees  Active extension 0 degrees  Neurological:  Sensation: ? Monofilament wire test of the leg/foot was normal.  Motor (Strength): ? No weakness of the left knee was observed.  Skin:  ? No cellulitis. Surgical incision well healed ?  Tests  Imaging:  X-Ray Knee:  A complete knee x-ray with standing views was performed -of left knee.  Impressions Radiology Test  X-ray of knee was performed intact left knee implant.  ?   Right hand exam:  Thenar atrophy  Full range of motion of all 5 fingers  Positive carpal compression test  Positive Phalens test  Decreased light touch sensation in the thumb, index, and long finger  2+?radial pulse  ?   Left hand exam:  Thenar atrophy  Full range of motion of all 5 fingers  Positive carpal compression test  Positive Phalens test  Decreased light touch sensation in the thumb, index, and long finger  2+?radial pulse  ?  Assessment/Plan  1.?S/P left unicompartmental knee replacement  Ordered:  Office/Outpatient Visit Level 3 Established [id number] PC, S/P left unicompartmental knee replacement  S/P right unicompartmental knee replacement, [facility], 10/03/18 13:44:00 CDT  ?  2.?S/P right unicompartmental knee replacement  Ordered:  Office/Outpatient Visit Level 3 Established [id number] PC, S/P left unicompartmental knee replacement  S/P right unicompartmental knee replacement, [facility], 10/03/18 13:44:00 CDT  ?  3.?Left carpal tunnel syndrome  ? Left hand carpal tunnel release  ?  Risk,benefits, alternatives, and complications of operative and nonoperative treatments were discussed with patient and family. They understand, agree, and want to proceed with operation/procedure.  ?  ?  4.?Right carpal tunnel syndrome  ?  5.?Afib  ?  6.?Hypertension  ?  7.?High cholesterol  ?  Orders:  Clinic Follow-up PRN, 10/03/18 13:44:00 CDT, Future Order, [facility]   Problem List/Past Medical History  Ongoing  Afib  High cholesterol  Hypertension  Irregular heart  beat  Primary osteoarthritis of right knee  Right carpal tunnel syndrome  S/P left unicompartmental knee replacement  S/P right unicompartmental knee replacement  Thyroid  Historical  ATRIAL FIBRILLATION  Benign hypertrophy of prostate  Hypothyroid  Procedure/Surgical History  BRIAN GARCÍA Partial Knee Arthroplasty Bilateral (Bilateral) (09/12/2017)  Replacement of Left Knee Joint with Unicondylar Synthetic Substitute, Cemented, Open Approach (09/12/2017)  Replacement of Right Knee Joint with Unicondylar Synthetic Substitute, Cemented, Open Approach (09/12/2017)  Robotic Assisted Procedure of Lower Extremity, Open Approach (09/12/2017)  Colonoscopy  Kidney   Medications  amiodarone 200 mg oral Tab, 100 mg, Oral, Daily  aspirin 81 mg oral Delayed Release (EC) tablet, 81 mg= 1 tab(s), Oral, Daily  Co Q-10, 100 mg, Oral, Daily  Colace 100 mg oral capsule, 200 mg= 2 cap(s), Oral, Daily,? ?Not taking  finasteride 5 mg oral tablet, 5 mg= 1 tab(s), Oral, Daily  levothyroxine 150 mcg (0.15 mg) oral tablet, 150 mcg= 1 tab(s), Oral, Daily  metoprolol succinate 25 mg oral tablet extended release, 25 mg= 1 tab(s), Oral, At Bedtime  metoprolol SUCCINATE 25 mg oral tablet, EXTENDED RELEASE, 1/2 tab, Oral, Daily  PRADAXA 150 MG CAPSULE, 150 mg= 1 cap(s), Oral, BID  pravastatin 40 mg oral tablet, 1 tab, Oral, At Bedtime  SULFAMETH//160MG TB, Oral, BID  tamsulosin 0.4 mg oral capsule, 0.4 mg= 1 cap(s), Oral, Daily  Taztia  mg/24 hours oral capsule, extended release, 240 mg= 1 cap(s), Oral, Daily  Valium 5 mg oral tablet, 5 mg= 1 tab(s), Oral, q6hr, PRN,? ?Not taking  Vitamin D3 2000 intl units oral capsule, 2000 IntUnit= 1 cap(s), Oral, Daily  vitamin E 1000 intl units oral capsule, 1000 IntUnit= 1 cap(s), Oral, Daily  Allergies  No Known Allergies  Social History  Alcohol - Denies Alcohol Use, 10/12/2013  Never, 08/07/2017  Home/Environment  Lives with Spouse., 09/25/2017  Substance Abuse - Denies Substance Abuse,  10/12/2013  Never, 08/07/2017  Tobacco - Denies Tobacco Use, 10/12/2013  Never smoker, 08/07/2017  Family History  Cancer: Father.  Heart: Father.  Hypertension.: Father.  Health Maintenance  Health Maintenance  ???Pending?(in the next year)  ??? ??OverDue  ??? ? ? ?Hypertension Management-BMP due??09/14/18??and every 1??year(s)  ??? ??Due?  ??? ? ? ?Aspirin Therapy for CVD Prevention due??09/13/18??and every 1??year(s)  ??? ? ? ?ADL Screening due??10/03/18??and every 1??year(s)  ??? ? ? ?Abdominal Aortic Aneurysm Screening due??10/03/18??and every 100??year(s)  ??? ? ? ?Alcohol Misuse Screening due??10/03/18??and every 1??year(s)  ??? ? ? ?Cognitive Screening due??10/03/18??and every 1??year(s)  ??? ? ? ?Colorectal Screening (Senior Wellness) due??10/03/18??and every 10??year(s)  ??? ? ? ?Functional Assessment due??10/03/18??and every 1??year(s)  ??? ? ? ?Geriatric Depression Screening due??10/03/18??and every 1??year(s)  ??? ? ? ?Hypertension Management-Education due??10/03/18??and every 1??year(s)  ??? ? ? ?Pneumococcal Vaccine due??10/03/18??Variable frequency  ??? ? ? ?Pneumococcal Vaccine due??10/03/18??and every?  ??? ? ? ?Smoking Cessation due??10/03/18??Variable frequency  ??? ? ? ?Tetanus Vaccine due??10/03/18??and every 10??year(s)  ??? ? ? ?Zoster Vaccine due??10/03/18??and every 100??year(s)  ??? ??Due In Future?  ??? ? ? ?Hypertension Management-Blood Pressure not due until??03/07/19??and every 1??year(s)  ??? ? ? ?Advance Directive not due until??03/07/19??and every 1??year(s)  ???Satisfied?(in the past 1 year)  ??? ??Satisfied?  ??? ? ? ?Advance Directive on??03/07/18.??Satisfied by Angelica Farmer LPN  ??? ? ? ?Blood Pressure Screening on??03/07/18.??Satisfied by Angelica Farmer LPN  ??? ? ? ?Body Mass Index Check on??10/03/18.??Satisfied by Angelica Farmer LPN  ??? ? ? ?Depression Screening on??10/03/18.??Satisfied by Angelica Faremr LPN  ??? ? ? ?Fall Risk Assessment on??10/03/18.??Satisfied by  Angelica Farmer LPN  ??? ? ? ?Hypertension Management-Blood Pressure on??03/07/18.??Satisfied by Angelica Farmer LPN  ??? ? ? ?Influenza Vaccine on??10/03/18.??Satisfied by Angelica Farmer LPN  ??? ? ? ?Obesity Screening on??10/03/18.??Satisfied by Angelica Farmer LPN  ?  ?

## 2022-05-05 NOTE — HISTORICAL [facility] CERNER
This is a historical note converted from [place]ia. Formatting and pictures may have been removed.  Please reference [place]ia for original formatting and attached multimedia. Chief Complaint  PATIENT HERE FOR VARSHA PKR DONE ON 09/12/17. STATES HE STEPPED FUNNY AND FELT A SHARP PAIN IN THE LATERAL SIDE OF THE LEFT KNEE. HE DOES HAVE A SLIGHT PAIN ON THE TOP OF HIS RIGHT KNEE  History of Present Illness  Patient was?working around his trailer?and lean to his?left side felt?pain in the lateral aspect of his?left knee.? He is 2 months postop?from bilateral medial compartment partial?knee arthroplasties.? The pain?has gotten better?but he wanted to get?this checked out today.  Review of Systems  Comprehensive?review of systems is unchanged  Physical Exam  Vitals & Measurements  BP:?140/60?  HT:?180?cm? HT:?180?cm? WT:?94.80?kg? WT:?94.80?kg? BMI:?29.26?  Cardiovascular:  Arterial Pulses: ? Dorsalis pedis pulses were normal.  Musculoskeletal System:  Right Knee:  General: ? No swelling of the knee. ? No warmth of the knee. ? No pain was elicited by motion of the knee. ? No instability of the knee. ? Knees demonstrated no muscle weakness.  Right Knee: ? Motion was normal.  Active flexion? ?? ?? ?? ?? ?_130 degrees  Active extension? ?? ?? ?? ?? 0 degrees  Neurological:  Sensation: ? Monofilament wire test of the leg/foot was normal.  Motor (Strength): ? No weakness of the right knee was observed.  Skin:  ? No cellulitis. Surgical incision well healed ?  Tests  Imaging:  X-Ray Knee:  A complete knee x-ray with standing views was performed -of right knee.  Impressions Radiology Test  X-ray of knee was performed intact right knee implant.  ?  ?  Cardiovascular:  Arterial Pulses: ? Dorsalis pedis pulses were normal.  Musculoskeletal System:  Left Knee:  General: ? No swelling of the knee. ? No warmth of the knee. ? No pain was elicited by motion of the knee. ? No instability of the knee. ? Knees demonstrated no muscle weakness.  Left  Knee: ? Motion was normal.  Active flexion? ?? ?? ?? ?? ?_130 degrees  Active extension? ?? ?? ?? ?? 0 degrees  Neurological:  Sensation: ? Monofilament wire test of the leg/foot was normal.  Motor (Strength): ? No weakness of the left knee was observed.  Skin:  ? No cellulitis. Surgical incision well healed ?  Tests  Imaging:  X-Ray Knee:  A complete knee x-ray with standing views was performed -of left knee.  Impressions Radiology Test  X-ray of knee was performed intact left knee implant.  Assessment/Plan  1.?S/P right unicompartmental knee replacement  Ordered:  Post-Op follow-up visit [id number] PC, S/P right unicompartmental knee replacement  S/P left unicompartmental knee replacement, [facility], 11/13/17 13:28:00 CST  ?  2.?S/P left unicompartmental knee replacement  Ordered:  Post-Op follow-up visit [id number] PC, S/P right unicompartmental knee replacement  S/P left unicompartmental knee replacement, [facility], 11/13/17 13:28:00 CST  ?  Orders:  Clinic Follow-up PRN, 11/13/17 13:28:00 CST, Future Order, [facility]   Problem List/Past Medical History  Ongoing  Afib  High cholesterol  Hypertension  Irregular heart beat  Primary osteoarthritis of right knee  S/P left unicompartmental knee replacement  S/P right unicompartmental knee replacement  Thyroid  Historical  ATRIAL FIBRILLATION  Benign hypertrophy of prostate  Hypothyroid  Procedure/Surgical History  [facility] Partial Knee Arthroplasty Bilateral (Bilateral) (09/12/2017)  Replacement of Left Knee Joint with Unicondylar Synthetic Substitute, Cemented, Open Approach (09/12/2017)  Replacement of Right Knee Joint with Unicondylar Synthetic Substitute, Cemented, Open Approach (09/12/2017)  Robotic Assisted Procedure of Lower Extremity, Open Approach (09/12/2017)  Colonoscopy  Kidney  Medications  amiodarone 200 mg oral Tab, 100 mg, Oral, Daily  aspirin 81 mg oral Delayed Release (EC) tablet, 81 mg= 1 tab(s), Oral, Daily  Co Q-10,  100 mg, Oral, Daily  Colace 100 mg oral capsule, 200 mg= 2 cap(s), Oral, Daily  finasteride 5 mg oral tablet, 5 mg= 1 tab(s), Oral, Daily  levothyroxine 150 mcg (0.15 mg) oral tablet, 150 mcg= 1 tab(s), Oral, Daily  metoprolol succinate 25 mg oral tablet extended release, 25 mg= 1 tab(s), Oral, At Bedtime  metoprolol SUCCINATE 25 mg oral tablet, EXTENDED RELEASE, 1/2 tab, Oral, Daily  PRADAXA 150 MG CAPSULE, 150 mg= 1 cap(s), Oral, BID  pravastatin 40 mg oral tablet, 1 tab, Oral, At Bedtime  SULFAMETH//160MG TB, Oral, BID  tamsulosin 0.4 mg oral capsule, 0.4 mg= 1 cap(s), Oral, Daily  Taztia  mg/24 hours oral capsule, extended release, 240 mg= 1 cap(s), Oral, Daily  Valium 5 mg oral tablet, 5 mg= 1 tab(s), Oral, q6hr, PRN  Vitamin D3 2000 intl units oral capsule, 2000 IntUnit= 1 cap(s), Oral, Daily  vitamin E 1000 intl units oral capsule, 1000 IntUnit= 1 cap(s), Oral, Daily  Allergies  No Known Allergies  Social History  Alcohol - Denies Alcohol Use, 09/25/2017  Never  Home/Environment - 09/25/2017  Lives with Spouse.  Substance Abuse - Denies Substance Abuse, 09/25/2017  Never  Tobacco - Denies Tobacco Use, 09/25/2017  Never smoker  Family History  Cancer: Father.  Heart: Father.  Hypertension.: Father.

## 2022-12-22 ENCOUNTER — LAB VISIT (OUTPATIENT)
Dept: LAB | Facility: HOSPITAL | Age: 77
End: 2022-12-22
Attending: SPECIALIST
Payer: MEDICARE

## 2022-12-22 ENCOUNTER — TELEPHONE (OUTPATIENT)
Dept: ORTHOPEDICS | Facility: CLINIC | Age: 77
End: 2022-12-22

## 2022-12-22 ENCOUNTER — OFFICE VISIT (OUTPATIENT)
Dept: ORTHOPEDICS | Facility: CLINIC | Age: 77
End: 2022-12-22
Payer: MEDICARE

## 2022-12-22 ENCOUNTER — HOSPITAL ENCOUNTER (OUTPATIENT)
Dept: RADIOLOGY | Facility: CLINIC | Age: 77
Discharge: HOME | End: 2022-12-22
Attending: PHYSICIAN ASSISTANT
Payer: MEDICARE

## 2022-12-22 VITALS
WEIGHT: 208.31 LBS | SYSTOLIC BLOOD PRESSURE: 155 MMHG | HEART RATE: 64 BPM | DIASTOLIC BLOOD PRESSURE: 81 MMHG | BODY MASS INDEX: 29.82 KG/M2 | HEIGHT: 70 IN

## 2022-12-22 DIAGNOSIS — M25.462 EFFUSION OF LEFT KNEE JOINT: ICD-10-CM

## 2022-12-22 DIAGNOSIS — M25.562 LEFT KNEE PAIN, UNSPECIFIED CHRONICITY: Primary | ICD-10-CM

## 2022-12-22 DIAGNOSIS — M25.562 LEFT KNEE PAIN, UNSPECIFIED CHRONICITY: ICD-10-CM

## 2022-12-22 DIAGNOSIS — Z96.652 STATUS POST LEFT PARTIAL KNEE REPLACEMENT: ICD-10-CM

## 2022-12-22 LAB
BASOPHILS # BLD AUTO: 0.06 X10(3)/MCL (ref 0–0.2)
BASOPHILS NFR BLD AUTO: 1 %
CRP SERPL HS-MCNC: 5 MG/L
EOSINOPHIL # BLD AUTO: 0.06 X10(3)/MCL (ref 0–0.9)
EOSINOPHIL NFR BLD AUTO: 1 %
ERYTHROCYTE [DISTWIDTH] IN BLOOD BY AUTOMATED COUNT: 13.1 % (ref 11.6–14.4)
ERYTHROCYTE [SEDIMENTATION RATE] IN BLOOD: 3 MM/HR (ref 0–15)
HCT VFR BLD AUTO: 43.7 % (ref 42–52)
HGB BLD-MCNC: 14.9 GM/DL (ref 14–18)
IMM GRANULOCYTES # BLD AUTO: 0.01 X10(3)/MCL (ref 0–0.04)
IMM GRANULOCYTES NFR BLD AUTO: 0.2 %
LYMPHOCYTES # BLD AUTO: 2.22 X10(3)/MCL (ref 0.6–4.6)
LYMPHOCYTES NFR BLD AUTO: 36.6 %
MCH RBC QN AUTO: 30 PG
MCHC RBC AUTO-ENTMCNC: 34.1 MG/DL (ref 33–36)
MCV RBC AUTO: 88.1 FL (ref 80–94)
MONOCYTES # BLD AUTO: 0.64 X10(3)/MCL (ref 0.1–1.3)
MONOCYTES NFR BLD AUTO: 10.5 %
NEUTROPHILS # BLD AUTO: 3.08 X10(3)/MCL (ref 2.1–9.2)
NEUTROPHILS NFR BLD AUTO: 50.7 %
NRBC BLD AUTO-RTO: 0 % (ref 0–1)
PLATELET # BLD AUTO: 170 X10(3)/MCL (ref 140–371)
PMV BLD AUTO: 10 FL (ref 9.4–12.4)
RBC # BLD AUTO: 4.96 X10(6)/MCL (ref 4.7–6.1)
WBC # SPEC AUTO: 6.1 X10(3)/MCL (ref 4.5–11.5)

## 2022-12-22 PROCEDURE — 99213 PR OFFICE/OUTPT VISIT, EST, LEVL III, 20-29 MIN: ICD-10-PCS | Mod: ,,, | Performed by: PHYSICIAN ASSISTANT

## 2022-12-22 PROCEDURE — 73564 XR KNEE COMP 4 OR MORE VIEWS LEFT: ICD-10-PCS | Mod: LT,,, | Performed by: PHYSICIAN ASSISTANT

## 2022-12-22 PROCEDURE — 99213 OFFICE O/P EST LOW 20 MIN: CPT | Mod: ,,, | Performed by: PHYSICIAN ASSISTANT

## 2022-12-22 PROCEDURE — 85025 COMPLETE CBC W/AUTO DIFF WBC: CPT

## 2022-12-22 PROCEDURE — 36415 COLL VENOUS BLD VENIPUNCTURE: CPT

## 2022-12-22 PROCEDURE — 86141 C-REACTIVE PROTEIN HS: CPT

## 2022-12-22 PROCEDURE — 85651 RBC SED RATE NONAUTOMATED: CPT

## 2022-12-22 PROCEDURE — 73564 X-RAY EXAM KNEE 4 OR MORE: CPT | Mod: LT,,, | Performed by: PHYSICIAN ASSISTANT

## 2022-12-22 RX ORDER — APIXABAN 5 MG/1
5 TABLET, FILM COATED ORAL 2 TIMES DAILY
COMMUNITY
Start: 2022-12-12

## 2022-12-22 RX ORDER — METHYLPREDNISOLONE 4 MG/1
TABLET ORAL
Qty: 1 EACH | Refills: 0 | Status: SHIPPED | OUTPATIENT
Start: 2022-12-22

## 2022-12-22 RX ORDER — LEVOTHYROXINE SODIUM 150 UG/1
1 TABLET ORAL DAILY
COMMUNITY

## 2022-12-22 RX ORDER — TAMSULOSIN HYDROCHLORIDE 0.4 MG/1
1 CAPSULE ORAL DAILY
COMMUNITY

## 2022-12-22 RX ORDER — PRAVASTATIN SODIUM 40 MG/1
1 TABLET ORAL NIGHTLY
COMMUNITY

## 2022-12-22 RX ORDER — FINASTERIDE 5 MG/1
1 TABLET, FILM COATED ORAL DAILY
COMMUNITY

## 2022-12-22 RX ORDER — FLECAINIDE ACETATE 100 MG/1
1 TABLET ORAL 2 TIMES DAILY
COMMUNITY

## 2022-12-22 RX ORDER — ACETAMINOPHEN 325 MG/1
650 TABLET ORAL
COMMUNITY

## 2022-12-22 RX ORDER — TADALAFIL 20 MG/1
1 TABLET ORAL
COMMUNITY
Start: 2022-11-30

## 2022-12-22 NOTE — TELEPHONE ENCOUNTER
Called patient no answer left a voicemail to have him give us a call back in regards to his lab/medicine.

## 2022-12-23 NOTE — PROGRESS NOTES
"Chief Complaint:   Chief Complaint   Patient presents with    Left Knee - Pain    Right Knee - Pain    Knee Pain     nisha pkr 09/17 with dr. victoria, states left knee pain more in the posterior side , states it started saturday. he has been having some swelling, sunday he was not able to walk, he has been wrapping to help reduce. denies weakness,        History of present illness:    This is a 77 y.o. year old male who complains of left knee pain.    Patient had partial knee replacement bilaterally proximally 5 years ago but recently his left knee has been giving him some discomfort.    Does not recall any type of injury or trauma but states that his knee pain after going to the [facility] for graduation started on the left side with some swelling.    He states the pain has gotten quite a bit better but he is concerned about the pain and swelling he is having once have it evaluated today    Review of Systems:    Constitution:   Denies chills, fever, and sweats.  HENT:   Denies headaches or blurry vision.  Cardiovascular:  Denies chest pain or irregular heart beat.  Respiratory:   Denies cough or shortness of breath.  Gastrointestinal:  Denies abdominal pain, nausea, or vomiting.  Musculoskeletal:   Denies muscle cramps.  Neurological:   Denies dizziness or focal weakness.  Psychiatric/Behavior: Normal mental status.  Hematology/Lymph:  Denies bleeding problem or easy bruising/bleeding.  Skin:    Denies rash or suspicious lesions.    Examination:    Vital Signs:    Vitals:    12/22/22 0907   BP: (!) 155/81   Pulse: 64   Weight: 94.5 kg (208 lb 4.8 oz)   Height: 5' 10" (1.778 m)       Body mass index is 29.89 kg/m².    Constitution:   Well-developed, well nourished patient in no acute distress.  Neurological:   Alert and oriented x 3 and cooperative to examination.     Psychiatric/Behavior: Normal mental status.  Respiratory:   No shortness of breath.  Eyes:    Extraoccular muscles intact  Skin:    No scars, rash or " suspicious lesions.    Physical Exam:       General Musculoskeletal Exam   Gait: antalgic       Left Knee Exam     Inspection   Erythema: absent  Effusion: present  Deformity:  Absent   Bruising: absent    Tenderness   The patient is tender to palpation of the patellofemoral joint line    Crepitus   The patient has crepitus with ROM    Range of Motion   Extension: abnormal   Flexion: abnormal   0-125    Tests   Meniscus   [place]:  Negative  Ligament Examination   MCL - Valgus: normal (0 to 2mm)  LCL - Varus: normal  Patella   Passive Patellar Tilt: neutral    Other   Sensation: normal    No erythema or warmth    Muscle Strength   Right Lower Extremity   Quadriceps:  5/5   Hamstrin/5     Vascular Exam     Right Pulses  Dorsalis Pedis:      2+  Posterior Tibial:      2+        Imaging: X-rays ordered and images interpreted today personally by me of four views left knee   Patient has intact prosthesis remote previous partial knee replacement of the medial compartment   No signs of lucency or subsidence   Patellofemoral joint does show some significant narrowing       Assessment: Left knee pain, unspecified chronicity  -     X-Ray Knee Complete 4 or More Views Left; Future; Expected date: 2022    Status post left partial knee replacement  -     CRP, High Sensitivity; Future; Expected date: 2022  -     CBC Auto Differential; Future; Expected date: 2022  -     Sedimentation rate; Future; Expected date: 2022    Effusion of left knee joint  -     CRP, High Sensitivity; Future; Expected date: 2022  -     CBC Auto Differential; Future; Expected date: 2022  -     Sedimentation rate; Future; Expected date: 2022    Other orders  -     methylPREDNISolone (MEDROL DOSEPACK) 4 mg tablet; use as directed  Dispense: 1 each; Refill: 0         Plan:  At this point we will get some blood work on the patient including a sed rate CRP and CBC to rule out any type of low lying infection that  may be present.    I think this is unlikely scenario but we will rule out prior to starting any other type of medicines or treatment at this point.    Patient is on blood thinners and can not use anti-inflammatories we will call him in a Medrol Dosepak to see if we can get his remaining swelling to go down and help his pain.      He will follow up with Dr. Jo 3-4 weeks for repeat evaluation          DISCLAIMER: This note may have been dictated using voice recognition software and may contain grammatical errors.     NOTE: Consult report sent to referring provider via [employer] EMR.

## 2023-01-11 ENCOUNTER — OFFICE VISIT (OUTPATIENT)
Dept: ORTHOPEDICS | Facility: CLINIC | Age: 78
End: 2023-01-11
Payer: MEDICARE

## 2023-01-11 VITALS
BODY MASS INDEX: 29.78 KG/M2 | HEART RATE: 68 BPM | DIASTOLIC BLOOD PRESSURE: 90 MMHG | WEIGHT: 208 LBS | HEIGHT: 70 IN | SYSTOLIC BLOOD PRESSURE: 162 MMHG

## 2023-01-11 DIAGNOSIS — M25.462 EFFUSION OF LEFT KNEE JOINT: Primary | ICD-10-CM

## 2023-01-11 DIAGNOSIS — M25.562 LEFT KNEE PAIN, UNSPECIFIED CHRONICITY: ICD-10-CM

## 2023-01-11 PROCEDURE — 99213 OFFICE O/P EST LOW 20 MIN: CPT | Mod: 25,,, | Performed by: SPECIALIST

## 2023-01-11 PROCEDURE — 20610 LARGE JOINT ASPIRATION/INJECTION: L KNEE: ICD-10-PCS | Mod: LT,,, | Performed by: SPECIALIST

## 2023-01-11 PROCEDURE — 20610 DRAIN/INJ JOINT/BURSA W/O US: CPT | Mod: LT,,, | Performed by: SPECIALIST

## 2023-01-11 PROCEDURE — 99213 PR OFFICE/OUTPT VISIT, EST, LEVL III, 20-29 MIN: ICD-10-PCS | Mod: 25,,, | Performed by: SPECIALIST

## 2023-01-11 NOTE — PROCEDURES
Large Joint Aspiration/Injection: L knee    Date/Time: 1/11/2023 1:00 PM  Performed by: Sky Barnett MD  Authorized by: Sky Barnett MD     Consent Done?:  Yes (Verbal)  Indications:  Joint swelling    Local anesthesia used?: Yes    Anesthesia:  Local infiltration  Local anesthetic:  Lidocaine 1% with epinephrine  Anesthetic total (ml):  5      Details:  Needle Size:  18 G  Approach:  Lateral  Location:  Knee  Site:  L knee  Aspirate amount (mL):  25  Aspirate:  Bloody  Patient tolerance:  Patient tolerated the procedure well with no immediate complications

## 2023-01-11 NOTE — PROGRESS NOTES
Past Medical History:   Diagnosis Date    Afib     Hypertension        Past Surgical History:   Procedure Laterality Date    KNEE SURGERY         Current Outpatient Medications   Medication Sig    acetaminophen (TYLENOL) 325 MG tablet Take 650 mg by mouth.    ELIQUIS 5 mg Tab Take 5 mg by mouth 2 (two) times daily.    finasteride (PROSCAR) 5 mg tablet Take 1 tablet by mouth once daily.    flecainide (TAMBOCOR) 100 MG Tab Take 1 tablet by mouth 2 (two) times daily.    levothyroxine (SYNTHROID) 150 MCG tablet Take 1 tablet by mouth once daily.    methylPREDNISolone (MEDROL DOSEPACK) 4 mg tablet use as directed    pravastatin (PRAVACHOL) 40 MG tablet Take 1 tablet by mouth once daily.    tadalafiL (CIALIS) 20 MG Tab 1 tablet.    tamsulosin (FLOMAX) 0.4 mg Cap Take 1 capsule by mouth once daily.     No current facility-administered medications for this visit.       Review of patient's allergies indicates:  No Known Allergies    Family History   Problem Relation Age of Onset    No Known Problems Mother     No Known Problems Father     No Known Problems Sister     No Known Problems Brother     No Known Problems Maternal Aunt     No Known Problems Maternal Uncle     No Known Problems Paternal Aunt     No Known Problems Paternal Uncle     No Known Problems Maternal Grandmother     No Known Problems Maternal Grandfather     No Known Problems Paternal Grandmother     No Known Problems Paternal Grandfather     No Known Problems Other     Anesthesia problems Neg Hx     Broken bones Neg Hx     Cancer Neg Hx     Clotting disorder Neg Hx     Collagen disease Neg Hx     Diabetes Neg Hx     Dislocations Neg Hx     Osteoporosis Neg Hx     Rheumatologic disease Neg Hx     Scoliosis Neg Hx     Severe sprains Neg Hx        Social History     Socioeconomic History    Marital status:    Tobacco Use    Smoking status: Never    Smokeless tobacco: Never   Substance and Sexual Activity    Alcohol use: Never    Drug use: Never  "      Chief Complaint:   Chief Complaint   Patient presents with    Left Knee - Results     Left knee lab results       Consulting Physician: No ref. provider found    History of present illness:    This is a 77 y.o. year old male who complains of pain in the left knee for about 2 weeks after coming back from a deer hunting trip.  He is not sure if he strained his knee.  He is 5 years postop from left medial compartment partial knee arthroplasty.  He is on anticoagulants.  CBC sed rate and C-reactive protein were performed and were all normal.    Review of Systems:    Constitution:   Denies chills, fever, and sweats.  HENT:   Denies headaches or blurry vision.  Cardiovascular:  Denies chest pain or irregular heart beat.  Respiratory:   Denies cough or shortness of breath.  Gastrointestinal:  Denies abdominal pain, nausea, or vomiting.  Musculoskeletal:   Denies muscle cramps.  Neurological:   Denies dizziness or focal weakness.  Psychiatric/Behavior: Normal mental status.  Hematology/Lymph:  Denies bleeding problem or easy bruising/bleeding.  Skin:    Denies rash or suspicious lesions.    Examination:    Vital Signs:    Vitals:    01/11/23 1320   BP: (!) 162/90   Pulse: 68   Weight: 94.3 kg (208 lb)   Height: 5' 10" (1.778 m)       Body mass index is 29.84 kg/m².    Constitution:   Well-developed, well nourished patient in no acute distress.  Neurological:   Alert and oriented x 3 and cooperative to examination.     Psychiatric/Behavior: Normal mental status.  Respiratory:   No shortness of breath.  Eyes:    Extraoccular muscles intact  Skin:    No scars, rash or suspicious lesions.    Physical Exam:  Left knee exam:   2+ effusion   No redness, no increased heat  Range of motion 0° to 125°   Normal patellar femoral tracking   2+ pulses with normal sensory and motor function  No atrophy   No pathologic laxity of the cruciate or collateral ligaments   No medial or lateral tenderness   Mild patellofemoral " tenderness  Imaging: X-rays reviewed which show four views of both knees.  Pristine interfaces and stable well-aligned medial compartment partial knee arthroplasties of both knees.  No fracture or dislocation.  Moderate patellofemoral osteoarthrosis of both knees.    Assessment: Effusion of left knee joint  -     Ambulatory referral/consult to Physical/Occupational Therapy; Future; Expected date: 01/18/2023    Left knee pain, unspecified chronicity        Plan:  After verbal consent and a sterile prep the left knee was anesthetized with lidocaine in the superolateral quadrant and an 18 gauge spinal needle was used to aspirate the left suprapatellar region of the left knee joint.  25 mL of old hematoma was aspirated.  Patient felt better.  He likely had had a spontaneous bleed into his left knee when he strained his knee causing hemarthrosis.  This should resolve over time.  Physical therapy will be ordered and I will see him back in 4-6 weeks for checkup.      DISCLAIMER: This note may have been dictated using voice recognition software and may contain grammatical errors.     NOTE: Consult report sent to referring provider via [employer].

## 2023-01-30 ENCOUNTER — OFFICE VISIT (OUTPATIENT)
Dept: ORTHOPEDICS | Facility: CLINIC | Age: 78
End: 2023-01-30
Payer: MEDICARE

## 2023-01-30 VITALS
DIASTOLIC BLOOD PRESSURE: 78 MMHG | HEART RATE: 65 BPM | BODY MASS INDEX: 29.18 KG/M2 | HEIGHT: 70 IN | WEIGHT: 203.81 LBS | SYSTOLIC BLOOD PRESSURE: 139 MMHG

## 2023-01-30 DIAGNOSIS — Z96.652 STATUS POST LEFT PARTIAL KNEE REPLACEMENT: Primary | ICD-10-CM

## 2023-01-30 DIAGNOSIS — M25.462 EFFUSION OF LEFT KNEE JOINT: ICD-10-CM

## 2023-01-30 PROCEDURE — 20610 LARGE JOINT ASPIRATION/INJECTION: L KNEE: ICD-10-PCS | Mod: LT,,, | Performed by: PHYSICIAN ASSISTANT

## 2023-01-30 PROCEDURE — 99213 PR OFFICE/OUTPT VISIT, EST, LEVL III, 20-29 MIN: ICD-10-PCS | Mod: 25,,, | Performed by: PHYSICIAN ASSISTANT

## 2023-01-30 PROCEDURE — 20610 DRAIN/INJ JOINT/BURSA W/O US: CPT | Mod: LT,,, | Performed by: PHYSICIAN ASSISTANT

## 2023-01-30 PROCEDURE — 99213 OFFICE O/P EST LOW 20 MIN: CPT | Mod: 25,,, | Performed by: PHYSICIAN ASSISTANT

## 2023-01-30 RX ORDER — CLOBETASOL PROPIONATE 0.5 MG/G
CREAM TOPICAL
COMMUNITY
Start: 2022-08-15

## 2023-01-30 RX ORDER — MONTELUKAST SODIUM 4 MG/1
1 TABLET, CHEWABLE ORAL
COMMUNITY

## 2023-01-30 RX ORDER — HYDROCODONE BITARTRATE AND ACETAMINOPHEN 5; 325 MG/1; MG/1
1 TABLET ORAL 3 TIMES DAILY
Qty: 21 TABLET | Refills: 0 | Status: SHIPPED | OUTPATIENT
Start: 2023-01-30 | End: 2023-02-06

## 2023-01-30 RX ORDER — DILTIAZEM HYDROCHLORIDE 240 MG/1
240 CAPSULE, EXTENDED RELEASE ORAL DAILY
COMMUNITY
Start: 2022-12-20

## 2023-01-30 RX ORDER — DOXYCYCLINE 100 MG/1
100 CAPSULE ORAL 2 TIMES DAILY
Qty: 14 CAPSULE | Refills: 0 | Status: SHIPPED | OUTPATIENT
Start: 2023-01-30 | End: 2023-02-06

## 2023-01-30 NOTE — PROGRESS NOTES
Past Medical History:   Diagnosis Date    Afib     Hypertension     Thyroid disease        Past Surgical History:   Procedure Laterality Date    CHOLECYSTECTOMY      KNEE SURGERY         Current Outpatient Medications   Medication Sig    acetaminophen (TYLENOL) 325 MG tablet Take 650 mg by mouth.    clobetasoL (TEMOVATE) 0.05 % cream Apply topically.    colestipoL (COLESTID) 1 gram Tab TAKE 1 TABLET BY MOUTH EVERY DAY WITH FATTY MEALS    diltiaZEM (TIAZAC) 240 MG Cs24 Take 240 mg by mouth.    ELIQUIS 5 mg Tab Take 5 mg by mouth 2 (two) times daily.    finasteride (PROSCAR) 5 mg tablet Take 1 tablet by mouth once daily.    flecainide (TAMBOCOR) 100 MG Tab Take 1 tablet by mouth 2 (two) times daily.    levothyroxine (SYNTHROID) 150 MCG tablet Take 1 tablet by mouth once daily.    pravastatin (PRAVACHOL) 40 MG tablet Take 1 tablet by mouth once daily.    tadalafiL (CIALIS) 20 MG Tab 1 tablet.    tamsulosin (FLOMAX) 0.4 mg Cap Take 1 capsule by mouth once daily.    doxycycline (VIBRAMYCIN) 100 MG Cap Take 1 capsule (100 mg total) by mouth 2 (two) times a day. for 7 days    HYDROcodone-acetaminophen (NORCO) 5-325 mg per tablet Take 1 tablet by mouth 3 (three) times daily. for 7 days    methylPREDNISolone (MEDROL DOSEPACK) 4 mg tablet use as directed (Patient not taking: Reported on 1/30/2023)     No current facility-administered medications for this visit.       Review of patient's allergies indicates:  No Known Allergies    Family History   Problem Relation Age of Onset    No Known Problems Mother     No Known Problems Father     No Known Problems Sister     No Known Problems Brother     No Known Problems Maternal Aunt     No Known Problems Maternal Uncle     No Known Problems Paternal Aunt     No Known Problems Paternal Uncle     No Known Problems Maternal Grandmother     No Known Problems Maternal Grandfather     No Known Problems Paternal Grandmother     No Known Problems Paternal Grandfather     No Known Problems  "Other     Anesthesia problems Neg Hx     Broken bones Neg Hx     Cancer Neg Hx     Clotting disorder Neg Hx     Collagen disease Neg Hx     Diabetes Neg Hx     Dislocations Neg Hx     Osteoporosis Neg Hx     Rheumatologic disease Neg Hx     Scoliosis Neg Hx     Severe sprains Neg Hx        Social History     Socioeconomic History    Marital status:    Tobacco Use    Smoking status: Never    Smokeless tobacco: Never   Substance and Sexual Activity    Alcohol use: Not Currently    Drug use: Never    Sexual activity: Yes     Partners: Female       Chief Complaint:   Chief Complaint   Patient presents with    Knee Injury     Pt reports a swelling in his knee which is very painful. Pt applied ice yesterday and gotten slightly better. Pain aggravates when he walks.        Consulting Physician: No ref. provider found    History of present illness:    This is a 77 y.o. year old male who presents today for left knee swelling.  He is asking for his knee to be aspirated.  He is not sure if he strained his knee.  He is 5 years postop from left medial compartment partial knee arthroplasty.  He is on anticoagulants.  This happened to him just 1 month ago.    Review of Systems:    Constitution:   Denies chills, fever, and sweats.  HENT:   Denies headaches or blurry vision.  Cardiovascular:  Denies chest pain or irregular heart beat.  Respiratory:   Denies cough or shortness of breath.  Gastrointestinal:  Denies abdominal pain, nausea, or vomiting.  Musculoskeletal:   Denies muscle cramps.  Neurological:   Denies dizziness or focal weakness.  Psychiatric/Behavior: Normal mental status.  Hematology/Lymph:  Denies bleeding problem or easy bruising/bleeding.  Skin:    Denies rash or suspicious lesions.    Examination:    Vital Signs:    Vitals:    01/30/23 1320 01/30/23 1326   BP: 139/78    Pulse: 65    Weight: 92.4 kg (203 lb 12.8 oz)    Height: 5' 10" (1.778 m)    PainSc:    5       Body mass index is 29.24 " kg/m².    Constitution:   Well-developed, well nourished patient in no acute distress.  Neurological:   Alert and oriented x 3 and cooperative to examination.     Psychiatric/Behavior: Normal mental status.  Respiratory:   No shortness of breath.  Eyes:    Extraoccular muscles intact  Skin:    No scars, rash or suspicious lesions.    Physical Exam:  Left knee exam:   2+ effusion   No redness, no increased heat  Range of motion 0° to 125°   Normal patellar femoral tracking   2+ pulses with normal sensory and motor function  No atrophy   No pathologic laxity of the cruciate or collateral ligaments   No medial or lateral tenderness   Mild patellofemoral tenderness    Assessment: Status post left partial knee replacement  -     Large Joint Aspiration/Injection: L knee  -     HYDROcodone-acetaminophen (NORCO) 5-325 mg per tablet; Take 1 tablet by mouth 3 (three) times daily. for 7 days  Dispense: 21 tablet; Refill: 0    Effusion of left knee joint  -     Large Joint Aspiration/Injection: L knee  -     doxycycline (VIBRAMYCIN) 100 MG Cap; Take 1 capsule (100 mg total) by mouth 2 (two) times a day. for 7 days  Dispense: 14 capsule; Refill: 0  -     HYDROcodone-acetaminophen (NORCO) 5-325 mg per tablet; Take 1 tablet by mouth 3 (three) times daily. for 7 days  Dispense: 21 tablet; Refill: 0        Plan  30 cc of blood was aspirated from the knee today.  Recommend a knee immobilizer and one-week follow-up.  We will place him on doxycycline 100 mg twice daily for 1 week and give him a little pain medicine for any significant pain.      DISCLAIMER: This note may have been dictated using voice recognition software and may contain grammatical errors.     NOTE: Consult report sent to referring provider via [employer].

## 2023-01-30 NOTE — PROCEDURES
Large Joint Aspiration/Injection: L knee    Date/Time: 1/30/2023 1:15 PM  Performed by: JOHNATHAN Amador  Authorized by: JOHNATHAN Amador     Consent Done?:  Yes (Verbal)  Indications:  Arthritis  Timeout: prior to procedure the correct patient, procedure, and site was verified    Prep: patient was prepped and draped in usual sterile fashion      Local anesthesia used?: Yes    Local anesthetic:  Lidocaine 2% without epinephrine    Details:  Needle Size:  25 G  Ultrasonic Guidance for needle placement?: No    Location:  Knee  Site:  L knee  Aspirate amount (mL):  30  Aspirate:  Bloody  Patient tolerance:  Patient tolerated the procedure well with no immediate complications

## 2023-02-06 ENCOUNTER — OFFICE VISIT (OUTPATIENT)
Dept: ORTHOPEDICS | Facility: CLINIC | Age: 78
End: 2023-02-06
Payer: MEDICARE

## 2023-02-06 VITALS
BODY MASS INDEX: 29.63 KG/M2 | HEIGHT: 70 IN | SYSTOLIC BLOOD PRESSURE: 145 MMHG | WEIGHT: 207 LBS | HEART RATE: 66 BPM | DIASTOLIC BLOOD PRESSURE: 87 MMHG

## 2023-02-06 DIAGNOSIS — M25.462 EFFUSION OF LEFT KNEE JOINT: Primary | ICD-10-CM

## 2023-02-06 PROCEDURE — 99213 PR OFFICE/OUTPT VISIT, EST, LEVL III, 20-29 MIN: ICD-10-PCS | Mod: ,,, | Performed by: SPECIALIST

## 2023-02-06 PROCEDURE — 99213 OFFICE O/P EST LOW 20 MIN: CPT | Mod: ,,, | Performed by: SPECIALIST

## 2023-02-06 NOTE — PROGRESS NOTES
Past Medical History:   Diagnosis Date    Afib     Hypertension     Thyroid disease        Past Surgical History:   Procedure Laterality Date    CHOLECYSTECTOMY      KNEE SURGERY         Current Outpatient Medications   Medication Sig    acetaminophen (TYLENOL) 325 MG tablet Take 650 mg by mouth.    clobetasoL (TEMOVATE) 0.05 % cream Apply topically.    colestipoL (COLESTID) 1 gram Tab TAKE 1 TABLET BY MOUTH EVERY DAY WITH FATTY MEALS    diltiaZEM (TIAZAC) 240 MG Cs24 Take 240 mg by mouth.    doxycycline (VIBRAMYCIN) 100 MG Cap Take 1 capsule (100 mg total) by mouth 2 (two) times a day. for 7 days    ELIQUIS 5 mg Tab Take 5 mg by mouth 2 (two) times daily.    finasteride (PROSCAR) 5 mg tablet Take 1 tablet by mouth once daily.    flecainide (TAMBOCOR) 100 MG Tab Take 1 tablet by mouth 2 (two) times daily.    HYDROcodone-acetaminophen (NORCO) 5-325 mg per tablet Take 1 tablet by mouth 3 (three) times daily. for 7 days    levothyroxine (SYNTHROID) 150 MCG tablet Take 1 tablet by mouth once daily.    methylPREDNISolone (MEDROL DOSEPACK) 4 mg tablet use as directed    pravastatin (PRAVACHOL) 40 MG tablet Take 1 tablet by mouth once daily.    tadalafiL (CIALIS) 20 MG Tab 1 tablet.    tamsulosin (FLOMAX) 0.4 mg Cap Take 1 capsule by mouth once daily.     No current facility-administered medications for this visit.       Review of patient's allergies indicates:  No Known Allergies    Family History   Problem Relation Age of Onset    No Known Problems Mother     No Known Problems Father     No Known Problems Sister     No Known Problems Brother     No Known Problems Maternal Aunt     No Known Problems Maternal Uncle     No Known Problems Paternal Aunt     No Known Problems Paternal Uncle     No Known Problems Maternal Grandmother     No Known Problems Maternal Grandfather     No Known Problems Paternal Grandmother     No Known Problems Paternal Grandfather     No Known Problems Other     Anesthesia problems Neg Hx      "Broken bones Neg Hx     Cancer Neg Hx     Clotting disorder Neg Hx     Collagen disease Neg Hx     Diabetes Neg Hx     Dislocations Neg Hx     Osteoporosis Neg Hx     Rheumatologic disease Neg Hx     Scoliosis Neg Hx     Severe sprains Neg Hx        Social History     Socioeconomic History    Marital status:    Tobacco Use    Smoking status: Never    Smokeless tobacco: Never   Substance and Sexual Activity    Alcohol use: Not Currently    Drug use: Never    Sexual activity: Yes     Partners: Female       Chief Complaint:   Chief Complaint   Patient presents with    Follow-up     Pt is present for a f/u for his aspiration. Pt reports a burning sensation in the site and he has been appying ice 2-3 x a day and complains about a mild pain/discomfort  that he rates as a 3/10.       Consulting Physician: No ref. provider found    History of present illness:    This is a 77 y.o. year old male who is doing much better with decreased swelling in the left knee.  He had an acute hemarthrosis secondary to anticoagulation last week.  Aspiration was performed.  He has been in a knee immobilizer and is much better with the immobilizer and icing.  Review of Systems:    Constitution:   Denies chills, fever, and sweats.  HENT:   Denies headaches or blurry vision.  Cardiovascular:  Denies chest pain or irregular heart beat.  Respiratory:   Denies cough or shortness of breath.  Gastrointestinal:  Denies abdominal pain, nausea, or vomiting.  Musculoskeletal:   Denies muscle cramps.  Neurological:   Denies dizziness or focal weakness.  Psychiatric/Behavior: Normal mental status.  Hematology/Lymph:  Denies bleeding problem or easy bruising/bleeding.  Skin:    Denies rash or suspicious lesions.    Examination:    Vital Signs:    Vitals:    02/06/23 1114 02/06/23 1121   BP:  (!) 145/87   Pulse:  66   Weight: 93.9 kg (207 lb) 93.9 kg (207 lb)   Height: 5' 10" (1.778 m) 5' 10" (1.778 m)   PainSc:    3       Body mass index is 29.7 " kg/m².    Constitution:   Well-developed, well nourished patient in no acute distress.  Neurological:   Alert and oriented x 3 and cooperative to examination.     Psychiatric/Behavior: Normal mental status.  Respiratory:   No shortness of breath.  Eyes:    Extraoccular muscles intact  Skin:    No scars, rash or suspicious lesions.    Physical Exam:  Left knee exam:   No redness, no increased heat   Small 1+ effusion/resolving hemarthrosis   No palpable tenderness   No pathologic laxity of the collateral ligaments  Neurovascular intact with 2+ pulses and normal sensory and motor function   Range of motion 0° to 120°           Assessment: Effusion of left knee joint        Plan:  Discontinue the knee immobilizer  Patient will start back with physical therapy tomorrow as already scheduled   Continue with ice therapy   Follow-up in 1 month for re-evaluation.  Patient has an appointment in 3 weeks with Dr. Navarro concerning his anticoagulant and he hopes to be able to discontinue this anticoagulant after his visit with cardiology.      DISCLAIMER: This note may have been dictated using voice recognition software and may contain grammatical errors.     NOTE: Consult report sent to referring provider via [employer].

## 2023-03-08 ENCOUNTER — HOSPITAL ENCOUNTER (OUTPATIENT)
Dept: RADIOLOGY | Facility: CLINIC | Age: 78
Discharge: HOME | End: 2023-03-08
Attending: SPECIALIST
Payer: MEDICARE

## 2023-03-08 ENCOUNTER — OFFICE VISIT (OUTPATIENT)
Dept: ORTHOPEDICS | Facility: CLINIC | Age: 78
End: 2023-03-08
Payer: MEDICARE

## 2023-03-08 VITALS
DIASTOLIC BLOOD PRESSURE: 75 MMHG | HEIGHT: 70 IN | SYSTOLIC BLOOD PRESSURE: 126 MMHG | WEIGHT: 204 LBS | BODY MASS INDEX: 29.2 KG/M2 | HEART RATE: 59 BPM

## 2023-03-08 DIAGNOSIS — M25.462 EFFUSION OF LEFT KNEE JOINT: ICD-10-CM

## 2023-03-08 DIAGNOSIS — M12.811 ROTATOR CUFF ARTHROPATHY OF RIGHT SHOULDER: ICD-10-CM

## 2023-03-08 DIAGNOSIS — M25.511 ACUTE PAIN OF RIGHT SHOULDER: ICD-10-CM

## 2023-03-08 DIAGNOSIS — M25.511 ACUTE PAIN OF RIGHT SHOULDER: Primary | ICD-10-CM

## 2023-03-08 DIAGNOSIS — Z96.652 STATUS POST LEFT PARTIAL KNEE REPLACEMENT: ICD-10-CM

## 2023-03-08 PROCEDURE — 73030 XR SHOULDER COMPLETE 2 OR MORE VIEWS RIGHT: ICD-10-PCS | Mod: RT,,, | Performed by: SPECIALIST

## 2023-03-08 PROCEDURE — 73030 X-RAY EXAM OF SHOULDER: CPT | Mod: RT,,, | Performed by: SPECIALIST

## 2023-03-08 PROCEDURE — 99213 PR OFFICE/OUTPT VISIT, EST, LEVL III, 20-29 MIN: ICD-10-PCS | Mod: ,,, | Performed by: PHYSICIAN ASSISTANT

## 2023-03-08 PROCEDURE — 99213 OFFICE O/P EST LOW 20 MIN: CPT | Mod: ,,, | Performed by: PHYSICIAN ASSISTANT

## 2023-03-08 RX ORDER — DABIGATRAN ETEXILATE 150 MG/1
1 CAPSULE ORAL 2 TIMES DAILY
COMMUNITY

## 2023-03-08 NOTE — PROGRESS NOTES
Chief Complaint:   Chief Complaint   Patient presents with    Follow-up     Lt knee aspiration- Pt states his knee is feeling better since last visit. He is actually doesnt have issue with his knee.     Shoulder Pain     Rt shoulder--Pt had an incident 3 weeks ago while crawfishing.Pt felt a strong pulling in his shoulder and since then he has been experiencing a pain that unable him  to raise his arm above the shoulder line. Pt is concerned If he has a torn ligament or pulled a muscle. Pt havent applied ice/heat and is not taking medication to ease out the pain.       History of present illness:    77 year old right hand dominant male presents today for a 1 month f/u for his left knee.  His swelling and pain has improved with physical therapy.  He has met with his cardiologist regarding continuing to take his eliquis for his A-fib vs discontinuing secondary to the recurrent left knee effusions.  He was recommended a possible procedure involving a filter to prevent blood clots in order to d/c his blood thinner.  He will consider his options.  He is also complaining of some right shoulder pain that started about 3 weeks ago when he was pulling on a small motor while crawfish ring.  The pain has gotten better     Past Medical History:   Diagnosis Date    Afib     Hypertension     Thyroid disease        Past Surgical History:   Procedure Laterality Date    CHOLECYSTECTOMY      KNEE SURGERY         Current Outpatient Medications   Medication Sig    acetaminophen (TYLENOL) 325 MG tablet Take 650 mg by mouth.    clobetasoL (TEMOVATE) 0.05 % cream Apply topically.    colestipoL (COLESTID) 1 gram Tab TAKE 1 TABLET BY MOUTH EVERY DAY WITH FATTY MEALS    dabigatran etexilate (PRADAXA) 150 mg Cap Take 1 capsule by mouth 2 (two) times daily.    diltiaZEM (TIAZAC) 240 MG Cs24 Take 240 mg by mouth.    finasteride (PROSCAR) 5 mg tablet Take 1 tablet by mouth once daily.    flecainide (TAMBOCOR) 100 MG Tab Take 1 tablet by mouth  2 (two) times daily.    levothyroxine (SYNTHROID) 150 MCG tablet Take 1 tablet by mouth once daily.    pravastatin (PRAVACHOL) 40 MG tablet Take 1 tablet by mouth once daily.    tadalafiL (CIALIS) 20 MG Tab 1 tablet.    tamsulosin (FLOMAX) 0.4 mg Cap Take 1 capsule by mouth once daily.    ELIQUIS 5 mg Tab Take 5 mg by mouth 2 (two) times daily.    methylPREDNISolone (MEDROL DOSEPACK) 4 mg tablet use as directed (Patient not taking: Reported on 3/8/2023)     No current facility-administered medications for this visit.       Review of patient's allergies indicates:  No Known Allergies    Family History   Problem Relation Age of Onset    No Known Problems Mother     No Known Problems Father     No Known Problems Sister     No Known Problems Brother     No Known Problems Maternal Aunt     No Known Problems Maternal Uncle     No Known Problems Paternal Aunt     No Known Problems Paternal Uncle     No Known Problems Maternal Grandmother     No Known Problems Maternal Grandfather     No Known Problems Paternal Grandmother     No Known Problems Paternal Grandfather     No Known Problems Other     Anesthesia problems Neg Hx     Broken bones Neg Hx     Cancer Neg Hx     Clotting disorder Neg Hx     Collagen disease Neg Hx     Diabetes Neg Hx     Dislocations Neg Hx     Osteoporosis Neg Hx     Rheumatologic disease Neg Hx     Scoliosis Neg Hx     Severe sprains Neg Hx        Social History     Socioeconomic History    Marital status:    Tobacco Use    Smoking status: Never    Smokeless tobacco: Never   Substance and Sexual Activity    Alcohol use: Not Currently    Drug use: Never    Sexual activity: Yes     Partners: Female         Review of Systems:    Constitution:   Denies chills, fever, and sweats.  HENT:   Denies headaches or blurry vision.  Cardiovascular:  Denies chest pain or irregular heart beat.  Respiratory:   Denies cough or shortness of breath.  Gastrointestinal:  Denies abdominal pain, nausea, or  "vomiting.  Musculoskeletal:   Denies muscle cramps.  Neurological:   Denies dizziness or focal weakness.  Psychiatric/Behavior: Normal mental status.  Hematology/Lymph:  Denies bleeding problem or easy bruising/bleeding.  Skin:    Denies rash or suspicious lesions.    Examination:    Vital Signs:    Vitals:    03/08/23 1312 03/08/23 1320   BP: 126/75    Pulse: (!) 59    Weight: 92.5 kg (204 lb)    Height: 5' 10" (1.778 m)    PainSc:    4       Body mass index is 29.27 kg/m².    Constitution:   Well-developed, well nourished patient in no acute distress.  Neurological:   Alert and oriented x 3 and cooperative to examination.     Psychiatric/Behavior: Normal mental status.  Respiratory:   No shortness of breath.  Eyes:    Extraoccular muscles intact  Skin:    No scars, rash or suspicious lesions.    Physical Exam:     right Shoulder:     No swelling, erythema or increased heat    Tender over deltoid, supraspinatus and infraspinatus    Tender over bicipital groove    negative drop arm test Positive Neer and Rajan impingement signs    weakness with rotator cuff resistance   Active shoulder abduction 180  Active forward elevation  180  Active internal rotation 70   Active external rotation 80     Radiographs of the right shoulder, four views, taken in the office today show advanced joint space narrowing of glenohumeral joint space with superior migration of the humeral head.    left Knee     Mild effusion    Well healed scar    No instability of the knee in mid or deep flexion     Active flexion 120 degrees     Active extension 0 degrees     No weakness observed.        Assessment:     Acute pain of right shoulder  -     X-ray Shoulder 2 or More Views Right; Future; Expected date: 03/08/2023    Rotator cuff arthropathy of right shoulder    Effusion of left knee joint    Status post left partial knee replacement        Plan:      He plans to resume his Eliquis and see if his knee has recurrent effusions.  He will " continue with physical therapy.  His shoulder is getting better.  We have discussed his rotator cuff arthropathy and possible physical therapy for pain control if it worsens.  We discussed definitive treatment which would be reverse total shoulder arthroplasty if no relief with conservative treatment.  He will follow up with us as needed        No follow-ups on file.    DISCLAIMER: This note may have been dictated using voice recognition software and may contain grammatical errors.

## 2023-07-26 ENCOUNTER — TELEPHONE (OUTPATIENT)
Dept: ORTHOPEDICS | Facility: CLINIC | Age: 78
End: 2023-07-26
Payer: MEDICARE

## 2023-07-26 NOTE — TELEPHONE ENCOUNTER
Patient called stating that there is a pocket on his elbow that is getting bigger.    I called the patient back to get more information, but the patient didn't answer. I left a voicemail for him to call the office back.

## 2023-07-26 NOTE — TELEPHONE ENCOUNTER
After speaking with Jae about this situation, he stated that if the elbow isn't red or warm, the patient isn't in any harm. However, if this does become an issue, then he needs to report to the nearest ER.     I called the patient and relayed this message.     He verbalized a clear understanding.     The patient confirmed an office visit date of 8/9/23 with Haider to be evaluated.

## 2023-07-27 NOTE — TELEPHONE ENCOUNTER
Patient called back stating that his right elbow is now red and warm. He stated that he would like to come in to get seen today.     I let the patient know that since Dr. Barnett and Haider are in surgery today, he needs to report to the ER for evaluation due to the redness and warmness in the right elbow.     He stated that he would like to try his PCP. I let him know that if he can get seen either today or tomorrow by his PCP or the NP there, then this is fine. If he can't be seen by them, then I re-iterated that he needs to report the ER.     The patient verbalized a clear understanding.

## 2023-08-09 ENCOUNTER — OFFICE VISIT (OUTPATIENT)
Dept: ORTHOPEDICS | Facility: CLINIC | Age: 78
End: 2023-08-09
Payer: MEDICARE

## 2023-08-09 ENCOUNTER — HOSPITAL ENCOUNTER (OUTPATIENT)
Dept: RADIOLOGY | Facility: CLINIC | Age: 78
Discharge: HOME | End: 2023-08-09
Attending: PHYSICIAN ASSISTANT
Payer: MEDICARE

## 2023-08-09 VITALS
SYSTOLIC BLOOD PRESSURE: 132 MMHG | WEIGHT: 208 LBS | DIASTOLIC BLOOD PRESSURE: 75 MMHG | HEART RATE: 52 BPM | HEIGHT: 70 IN | BODY MASS INDEX: 29.78 KG/M2

## 2023-08-09 DIAGNOSIS — M25.521 RIGHT ELBOW PAIN: Primary | ICD-10-CM

## 2023-08-09 DIAGNOSIS — M25.521 RIGHT ELBOW PAIN: ICD-10-CM

## 2023-08-09 DIAGNOSIS — M70.21 OLECRANON BURSITIS OF RIGHT ELBOW: ICD-10-CM

## 2023-08-09 PROCEDURE — 20605 DRAIN/INJ JOINT/BURSA W/O US: CPT | Mod: RT,,, | Performed by: PHYSICIAN ASSISTANT

## 2023-08-09 PROCEDURE — 99214 OFFICE O/P EST MOD 30 MIN: CPT | Mod: 25,,, | Performed by: PHYSICIAN ASSISTANT

## 2023-08-09 PROCEDURE — 73080 XR ELBOW COMPLETE 3 VIEW RIGHT: ICD-10-PCS | Mod: RT,,, | Performed by: PHYSICIAN ASSISTANT

## 2023-08-09 PROCEDURE — 73080 X-RAY EXAM OF ELBOW: CPT | Mod: RT,,, | Performed by: PHYSICIAN ASSISTANT

## 2023-08-09 PROCEDURE — 99214 PR OFFICE/OUTPT VISIT, EST, LEVL IV, 30-39 MIN: ICD-10-PCS | Mod: 25,,, | Performed by: PHYSICIAN ASSISTANT

## 2023-08-09 PROCEDURE — 20605 INTERMEDIATE JOINT ASPIRATION/INJECTION: R OLECRANON BURSA: ICD-10-PCS | Mod: RT,,, | Performed by: PHYSICIAN ASSISTANT

## 2023-08-09 RX ORDER — CLINDAMYCIN HYDROCHLORIDE 300 MG/1
1 CAPSULE ORAL
COMMUNITY
Start: 2023-07-27

## 2023-08-09 RX ORDER — PREDNISONE 20 MG/1
20 TABLET ORAL
COMMUNITY
Start: 2023-07-27

## 2023-08-09 RX ORDER — BETAMETHASONE SODIUM PHOSPHATE AND BETAMETHASONE ACETATE 3; 3 MG/ML; MG/ML
6 INJECTION, SUSPENSION INTRA-ARTICULAR; INTRALESIONAL; INTRAMUSCULAR; SOFT TISSUE
Status: DISCONTINUED | OUTPATIENT
Start: 2023-08-09 | End: 2023-08-09 | Stop reason: HOSPADM

## 2023-08-09 RX ORDER — LIDOCAINE HYDROCHLORIDE 10 MG/ML
1 INJECTION INFILTRATION; PERINEURAL
Status: DISCONTINUED | OUTPATIENT
Start: 2023-08-09 | End: 2023-08-09 | Stop reason: HOSPADM

## 2023-08-09 RX ADMIN — LIDOCAINE HYDROCHLORIDE 1 ML: 10 INJECTION INFILTRATION; PERINEURAL at 03:08

## 2023-08-09 RX ADMIN — BETAMETHASONE SODIUM PHOSPHATE AND BETAMETHASONE ACETATE 6 MG: 3; 3 INJECTION, SUSPENSION INTRA-ARTICULAR; INTRALESIONAL; INTRAMUSCULAR; SOFT TISSUE at 03:08

## 2023-08-09 NOTE — PROCEDURES
Intermediate Joint Aspiration/Injection: R olecranon bursa    Date/Time: 8/9/2023 3:15 PM    Performed by: Haider Castro PA  Authorized by: Haider Castro PA    Consent Done?:  Yes (Verbal)  Indications:  Joint swelling  Site marked: The procedure site was marked      Location:  Elbow  Site:  R olecranon bursa  Prep: Patient was prepped and draped in usual sterile fashion    Medications:  1 mL LIDOcaine HCL 10 mg/ml (1%) 10 mg/mL (1 %); 6 mg betamethasone acetate-betamethasone sodium phosphate 6 mg/mL  Aspirate amount (ml):  25  Aspirate:  Bloody

## 2023-08-09 NOTE — PROGRESS NOTES
Chief Complaint:   Chief Complaint   Patient presents with    Right Elbow - Pain, Swelling    Elbow Pain     Pt presents fluids in his RT elbow. Pt states it is slightly tender, and mildly painful if he touches the site. Pt is actually taking Cleocin.       History of present illness:    78-year-old right-hand dominant male presents office today for evaluation his right elbow swelling.  This has been present for 3 weeks.  Noted some swelling to the posterior aspect of the elbow over the olecranon.  He denies any history of injury.  He was seen by his PCP and placed on oral prednisone and clindamycin.  He has seen no improvement.  He does have a history of atrial fibrillation and currently on Eliquis    Past Medical History:   Diagnosis Date    Afib     Hypertension     Thyroid disease        Past Surgical History:   Procedure Laterality Date    CHOLECYSTECTOMY      KNEE SURGERY         Current Outpatient Medications   Medication Sig    acetaminophen (TYLENOL) 325 MG tablet Take 650 mg by mouth.    clindamycin (CLEOCIN) 300 MG capsule 1 capsule.    clobetasoL (TEMOVATE) 0.05 % cream Apply topically.    colestipoL (COLESTID) 1 gram Tab TAKE 1 TABLET BY MOUTH EVERY DAY WITH FATTY MEALS    dabigatran etexilate (PRADAXA) 150 mg Cap Take 1 capsule by mouth 2 (two) times daily.    diltiaZEM (TIAZAC) 240 MG Cs24 Take 240 mg by mouth.    ELIQUIS 5 mg Tab Take 5 mg by mouth 2 (two) times daily.    finasteride (PROSCAR) 5 mg tablet Take 1 tablet by mouth once daily.    flecainide (TAMBOCOR) 100 MG Tab Take 1 tablet by mouth 2 (two) times daily.    levothyroxine (SYNTHROID) 150 MCG tablet Take 1 tablet by mouth once daily.    pravastatin (PRAVACHOL) 40 MG tablet Take 1 tablet by mouth once daily.    predniSONE (DELTASONE) 20 MG tablet Take 20 mg by mouth.    tadalafiL (CIALIS) 20 MG Tab 1 tablet.    tamsulosin (FLOMAX) 0.4 mg Cap Take 1 capsule by mouth once daily.    methylPREDNISolone (MEDROL DOSEPACK) 4 mg tablet use as  "directed (Patient not taking: Reported on 8/9/2023)     No current facility-administered medications for this visit.       Review of patient's allergies indicates:  No Known Allergies    Family History   Problem Relation Age of Onset    No Known Problems Mother     No Known Problems Father     No Known Problems Sister     No Known Problems Brother     No Known Problems Maternal Aunt     No Known Problems Maternal Uncle     No Known Problems Paternal Aunt     No Known Problems Paternal Uncle     No Known Problems Maternal Grandmother     No Known Problems Maternal Grandfather     No Known Problems Paternal Grandmother     No Known Problems Paternal Grandfather     No Known Problems Other     Anesthesia problems Neg Hx     Broken bones Neg Hx     Cancer Neg Hx     Clotting disorder Neg Hx     Collagen disease Neg Hx     Diabetes Neg Hx     Dislocations Neg Hx     Osteoporosis Neg Hx     Rheumatologic disease Neg Hx     Scoliosis Neg Hx     Severe sprains Neg Hx        Social History     Socioeconomic History    Marital status:    Tobacco Use    Smoking status: Never    Smokeless tobacco: Never   Substance and Sexual Activity    Alcohol use: Not Currently    Drug use: Never    Sexual activity: Yes     Partners: Female         Review of Systems:    Constitution:   Denies chills, fever, and sweats.  HENT:   Denies headaches or blurry vision.  Cardiovascular:  Denies chest pain or irregular heart beat.  Respiratory:   Denies cough or shortness of breath.  Gastrointestinal:  Denies abdominal pain, nausea, or vomiting.  Musculoskeletal:   Denies muscle cramps.  Neurological:   Denies dizziness or focal weakness.  Psychiatric/Behavior: Normal mental status.  Hematology/Lymph:  Denies bleeding problem or easy bruising/bleeding.  Skin:    Denies rash or suspicious lesions.    Examination:    Vital Signs:    Vitals:    08/09/23 1513   BP: 132/75   Pulse: (!) 52   Weight: 94.3 kg (208 lb)   Height: 5' 10" (1.778 m) "       Body mass index is 29.84 kg/m².    Constitution:   Well-developed, well nourished patient in no acute distress.  Neurological:   Alert and oriented x 3 and cooperative to examination.     Psychiatric/Behavior: Normal mental status.  Respiratory:   No shortness of breath.  Nonlabored breathing  Cardiovascular:           Regular rate and rhythm  Eyes:    Extraoccular muscles intact  Skin:    No scars, rash or suspicious lesions.    Physical Exam:     Right elbow exam:  Moderate swelling at olecranon bursa without erythema  No joint effusion  No tenderness over medial or lateral elbow  Full elbow ROM without pain  No instability  NVI distally      Right elbow xrays taken today, 3 views show no osteoarticular abnormalities        Assessment:     Right elbow pain  -     X-Ray Elbow Complete 3 views Right; Future; Expected date: 08/09/2023        Plan:      I discussed exam and x-ray findings with the patient today.  He has some olecranon bursitis that was aspirated in revealed blood.  We have discussed that this is secondary to his Eliquis as he has experienced hemarthrosis in the knee in the past.  I have injected some cortisone to help.  Recommend compression and ice and he will follow up with us as needed.  If his effusion returns and he will contact us and we will get him back in for further evaluation and most likely repeat aspiration        No follow-ups on file.    DISCLAIMER: This note may have been dictated using voice recognition software and may contain grammatical errors.

## 2023-08-21 ENCOUNTER — TELEPHONE (OUTPATIENT)
Dept: ORTHOPEDICS | Facility: CLINIC | Age: 78
End: 2023-08-21
Payer: MEDICARE

## 2023-08-21 NOTE — TELEPHONE ENCOUNTER
RT elbow pain return 08/18/23. Wrapped. Hard.     Patient called stating that on 8/18/23, his right elbow started to have fluid in it. He states that it has gotten bigger since then. He states that it's very hard. He currently has it wrapped to help try and compress it and to help with the pain.     The patient would like to know if he needs to come back into the office to re-aspirate his right elbow?     I told the patient that I will speak with Haider and then call back with a response.     The patient verbalized a clear understanding.

## 2023-08-21 NOTE — TELEPHONE ENCOUNTER
I called the patient to relay Haider's message. The patient would prefer being seen this week regarding this matter.     The patient will be coming to the office to see Haider on 8/23/23 at 8:15.     The patient verbalized a clear understanding.

## 2023-08-23 ENCOUNTER — OFFICE VISIT (OUTPATIENT)
Dept: ORTHOPEDICS | Facility: CLINIC | Age: 78
End: 2023-08-23
Payer: MEDICARE

## 2023-08-23 VITALS
DIASTOLIC BLOOD PRESSURE: 77 MMHG | WEIGHT: 205 LBS | BODY MASS INDEX: 29.35 KG/M2 | HEIGHT: 70 IN | HEART RATE: 64 BPM | SYSTOLIC BLOOD PRESSURE: 138 MMHG

## 2023-08-23 DIAGNOSIS — M70.21 OLECRANON BURSITIS OF RIGHT ELBOW: Primary | ICD-10-CM

## 2023-08-23 PROCEDURE — 99214 PR OFFICE/OUTPT VISIT, EST, LEVL IV, 30-39 MIN: ICD-10-PCS | Mod: 25,,, | Performed by: PHYSICIAN ASSISTANT

## 2023-08-23 PROCEDURE — 20605 DRAIN/INJ JOINT/BURSA W/O US: CPT | Mod: RT,,, | Performed by: PHYSICIAN ASSISTANT

## 2023-08-23 PROCEDURE — 99214 OFFICE O/P EST MOD 30 MIN: CPT | Mod: 25,,, | Performed by: PHYSICIAN ASSISTANT

## 2023-08-23 PROCEDURE — 20605 INTERMEDIATE JOINT ASPIRATION/INJECTION: R OLECRANON BURSA: ICD-10-PCS | Mod: RT,,, | Performed by: PHYSICIAN ASSISTANT

## 2023-08-23 RX ORDER — LIDOCAINE HYDROCHLORIDE 10 MG/ML
1 INJECTION INFILTRATION; PERINEURAL
Status: DISCONTINUED | OUTPATIENT
Start: 2023-08-23 | End: 2023-08-23 | Stop reason: HOSPADM

## 2023-08-23 RX ADMIN — LIDOCAINE HYDROCHLORIDE 1 ML: 10 INJECTION INFILTRATION; PERINEURAL at 08:08

## 2023-08-23 NOTE — PROCEDURES
Intermediate Joint Aspiration/Injection: R olecranon bursa    Date/Time: 8/23/2023 8:15 AM    Performed by: Haider Castro PA  Authorized by: Haider Castro PA    Consent Done?:  Yes (Verbal)  Indications:  Joint swelling  Site marked: The procedure site was marked      Location:  Elbow  Site:  R olecranon bursa  Needle size:  18 G  Approach:  Posterior  Medications:  1 mL LIDOcaine HCL 10 mg/ml (1%) 10 mg/mL (1 %)  Aspirate amount (ml):  35  Aspirate:  Bloody

## 2023-08-23 NOTE — PROGRESS NOTES
Chief Complaint:   Chief Complaint   Patient presents with    Right Elbow - Swelling    Aspiration     Pt is present to do an aspiration in his Rt elbow.        History of present illness:    78-year-old right-hand dominant male presents office today for evaluation his right elbow swelling.  He returns for repeat aspiration.  His elbow was aspirated 2 weeks ago in the swelling has returned.  Noted some swelling to the posterior aspect of the elbow over the olecranon.  He denies any history of injury.  He was seen by his PCP and placed on oral prednisone and clindamycin.  He has seen no improvement.  He does have a history of atrial fibrillation and currently on Eliquis    Past Medical History:   Diagnosis Date    Afib     Hypertension     Thyroid disease        Past Surgical History:   Procedure Laterality Date    CHOLECYSTECTOMY      KNEE SURGERY         Current Outpatient Medications   Medication Sig    acetaminophen (TYLENOL) 325 MG tablet Take 650 mg by mouth.    clindamycin (CLEOCIN) 300 MG capsule 1 capsule.    clobetasoL (TEMOVATE) 0.05 % cream Apply topically.    colestipoL (COLESTID) 1 gram Tab TAKE 1 TABLET BY MOUTH EVERY DAY WITH FATTY MEALS    dabigatran etexilate (PRADAXA) 150 mg Cap Take 1 capsule by mouth 2 (two) times daily.    diltiaZEM (TIAZAC) 240 MG Cs24 Take 240 mg by mouth.    ELIQUIS 5 mg Tab Take 5 mg by mouth 2 (two) times daily.    finasteride (PROSCAR) 5 mg tablet Take 1 tablet by mouth once daily.    flecainide (TAMBOCOR) 100 MG Tab Take 1 tablet by mouth 2 (two) times daily.    levothyroxine (SYNTHROID) 150 MCG tablet Take 1 tablet by mouth once daily.    pravastatin (PRAVACHOL) 40 MG tablet Take 1 tablet by mouth once daily.    predniSONE (DELTASONE) 20 MG tablet Take 20 mg by mouth.    tadalafiL (CIALIS) 20 MG Tab 1 tablet.    tamsulosin (FLOMAX) 0.4 mg Cap Take 1 capsule by mouth once daily.    methylPREDNISolone (MEDROL DOSEPACK) 4 mg tablet use as directed (Patient not taking:  "Reported on 8/23/2023)     No current facility-administered medications for this visit.       Review of patient's allergies indicates:  No Known Allergies    Family History   Problem Relation Age of Onset    No Known Problems Mother     No Known Problems Father     No Known Problems Sister     No Known Problems Brother     No Known Problems Maternal Aunt     No Known Problems Maternal Uncle     No Known Problems Paternal Aunt     No Known Problems Paternal Uncle     No Known Problems Maternal Grandmother     No Known Problems Maternal Grandfather     No Known Problems Paternal Grandmother     No Known Problems Paternal Grandfather     No Known Problems Other     Anesthesia problems Neg Hx     Broken bones Neg Hx     Cancer Neg Hx     Clotting disorder Neg Hx     Collagen disease Neg Hx     Diabetes Neg Hx     Dislocations Neg Hx     Osteoporosis Neg Hx     Rheumatologic disease Neg Hx     Scoliosis Neg Hx     Severe sprains Neg Hx        Social History     Socioeconomic History    Marital status:    Tobacco Use    Smoking status: Never    Smokeless tobacco: Never   Substance and Sexual Activity    Alcohol use: Not Currently    Drug use: Never    Sexual activity: Yes     Partners: Female         Review of Systems:    Constitution:   Denies chills, fever, and sweats.  HENT:   Denies headaches or blurry vision.  Cardiovascular:  Denies chest pain or irregular heart beat.  Respiratory:   Denies cough or shortness of breath.  Gastrointestinal:  Denies abdominal pain, nausea, or vomiting.  Musculoskeletal:   Denies muscle cramps.  Neurological:   Denies dizziness or focal weakness.  Psychiatric/Behavior: Normal mental status.  Hematology/Lymph:  Denies bleeding problem or easy bruising/bleeding.  Skin:    Denies rash or suspicious lesions.    Examination:    Vital Signs:    Vitals:    08/23/23 0833   BP: 138/77   Pulse: 64   Weight: 93 kg (205 lb)   Height: 5' 10" (1.778 m)       Body mass index is 29.41 " kg/m².    Constitution:   Well-developed, well nourished patient in no acute distress.  Neurological:   Alert and oriented x 3 and cooperative to examination.     Psychiatric/Behavior: Normal mental status.  Respiratory:   No shortness of breath.  Nonlabored breathing  Cardiovascular:           Regular rate and rhythm  Eyes:    Extraoccular muscles intact  Skin:    No scars, rash or suspicious lesions.    Physical Exam:     Right elbow exam:  Moderate swelling at olecranon bursa without erythema  No joint effusion  No tenderness over medial or lateral elbow  Full elbow ROM without pain  No instability  NVI distally              Assessment:     Olecranon bursitis of right elbow  -     Intermediate Joint Aspiration/Injection: R olecranon bursa        Plan:      I will make him a one-week follow up with Dr. Barnett for further evaluation with possible repeat aspiration versus surgical discussion.        No follow-ups on file.    DISCLAIMER: This note may have been dictated using voice recognition software and may contain grammatical errors.

## 2023-08-28 ENCOUNTER — OFFICE VISIT (OUTPATIENT)
Dept: ORTHOPEDICS | Facility: CLINIC | Age: 78
End: 2023-08-28
Payer: MEDICARE

## 2023-08-28 VITALS
WEIGHT: 205 LBS | DIASTOLIC BLOOD PRESSURE: 74 MMHG | BODY MASS INDEX: 29.35 KG/M2 | HEART RATE: 51 BPM | SYSTOLIC BLOOD PRESSURE: 127 MMHG | HEIGHT: 70 IN

## 2023-08-28 DIAGNOSIS — M70.21 OLECRANON BURSITIS OF RIGHT ELBOW: Primary | ICD-10-CM

## 2023-08-28 PROCEDURE — 99214 OFFICE O/P EST MOD 30 MIN: CPT | Mod: ,,, | Performed by: SPECIALIST

## 2023-08-28 PROCEDURE — 99214 PR OFFICE/OUTPT VISIT, EST, LEVL IV, 30-39 MIN: ICD-10-PCS | Mod: ,,, | Performed by: SPECIALIST

## 2023-08-28 NOTE — PROGRESS NOTES
Chief Complaint:   Chief Complaint   Patient presents with    Right Elbow - Swelling    Aspiration      Pt presents fluids in his Rt elbow.        History of present illness:    78-year-old right-hand dominant male presents today for a 1 week  evaluation his right elbow swelling.   His elbow was aspirated 2 separate occasions and the swelling has returned.  Noted some swelling to the posterior aspect of the elbow over the olecranon.  He denies any history of injury.  He was seen by his PCP and placed on oral prednisone and clindamycin.  He has seen no improvement.  He does have a history of atrial fibrillation and currently on Eliquis    Past Medical History:   Diagnosis Date    Afib     Hypertension     Thyroid disease        Past Surgical History:   Procedure Laterality Date    CHOLECYSTECTOMY      KNEE SURGERY         Current Outpatient Medications   Medication Sig    acetaminophen (TYLENOL) 325 MG tablet Take 650 mg by mouth.    clobetasoL (TEMOVATE) 0.05 % cream Apply topically.    colestipoL (COLESTID) 1 gram Tab TAKE 1 TABLET BY MOUTH EVERY DAY WITH FATTY MEALS    dabigatran etexilate (PRADAXA) 150 mg Cap Take 1 capsule by mouth 2 (two) times daily.    diltiaZEM (TIAZAC) 240 MG Cs24 Take 240 mg by mouth.    ELIQUIS 5 mg Tab Take 5 mg by mouth 2 (two) times daily.    finasteride (PROSCAR) 5 mg tablet Take 1 tablet by mouth once daily.    flecainide (TAMBOCOR) 100 MG Tab Take 1 tablet by mouth 2 (two) times daily.    levothyroxine (SYNTHROID) 150 MCG tablet Take 1 tablet by mouth once daily.    methylPREDNISolone (MEDROL DOSEPACK) 4 mg tablet use as directed    pravastatin (PRAVACHOL) 40 MG tablet Take 1 tablet by mouth once daily.    predniSONE (DELTASONE) 20 MG tablet Take 20 mg by mouth.    tadalafiL (CIALIS) 20 MG Tab 1 tablet.    tamsulosin (FLOMAX) 0.4 mg Cap Take 1 capsule by mouth once daily.    clindamycin (CLEOCIN) 300 MG capsule 1 capsule.     No current facility-administered medications for this  "visit.       Review of patient's allergies indicates:  No Known Allergies    Family History   Problem Relation Age of Onset    No Known Problems Mother     No Known Problems Father     No Known Problems Sister     No Known Problems Brother     No Known Problems Maternal Aunt     No Known Problems Maternal Uncle     No Known Problems Paternal Aunt     No Known Problems Paternal Uncle     No Known Problems Maternal Grandmother     No Known Problems Maternal Grandfather     No Known Problems Paternal Grandmother     No Known Problems Paternal Grandfather     No Known Problems Other     Anesthesia problems Neg Hx     Broken bones Neg Hx     Cancer Neg Hx     Clotting disorder Neg Hx     Collagen disease Neg Hx     Diabetes Neg Hx     Dislocations Neg Hx     Osteoporosis Neg Hx     Rheumatologic disease Neg Hx     Scoliosis Neg Hx     Severe sprains Neg Hx        Social History     Socioeconomic History    Marital status:    Tobacco Use    Smoking status: Never    Smokeless tobacco: Never   Substance and Sexual Activity    Alcohol use: Not Currently    Drug use: Never    Sexual activity: Yes     Partners: Female         Review of Systems:    Constitution:   Denies chills, fever, and sweats.  HENT:   Denies headaches or blurry vision.  Cardiovascular:  Denies chest pain or irregular heart beat.  Respiratory:   Denies cough or shortness of breath.  Gastrointestinal:  Denies abdominal pain, nausea, or vomiting.  Musculoskeletal:   Denies muscle cramps.  Neurological:   Denies dizziness or focal weakness.  Psychiatric/Behavior: Normal mental status.  Hematology/Lymph:  Denies bleeding problem or easy bruising/bleeding.  Skin:    Denies rash or suspicious lesions.    Examination:    Vital Signs:    Vitals:    08/28/23 1248   BP: 127/74   Pulse: (!) 51   Weight: 93 kg (205 lb)   Height: 5' 10" (1.778 m)       Body mass index is 29.41 kg/m².    Constitution:   Well-developed, well nourished patient in no acute " distress.  Neurological:   Alert and oriented x 3 and cooperative to examination.     Psychiatric/Behavior: Normal mental status.  Respiratory:   No shortness of breath.  Nonlabored breathing  Cardiovascular:           Regular rate and rhythm  Eyes:    Extraoccular muscles intact  Skin:    No scars, rash or suspicious lesions.    Physical Exam:     Right elbow exam:  Moderate swelling at olecranon bursa without erythema  No joint effusion  No tenderness over medial or lateral elbow  Full elbow ROM without pain  No instability  NVI distally              Assessment:     Olecranon bursitis of right elbow        Plan:    He continues to have recurrent olecranon bursitis secondary to eliquis.  2 separate aspirations revealing blood but no signs of infection.  He has been using compression.  We have discussed olecranon bursectomy as definitive treatment.  Do not recommend repeat aspiration today.  F/u as needed.          No follow-ups on file.    DISCLAIMER: This note may have been dictated using voice recognition software and may contain grammatical errors.

## 2023-09-12 ENCOUNTER — TELEPHONE (OUTPATIENT)
Dept: ORTHOPEDICS | Facility: CLINIC | Age: 78
End: 2023-09-12
Payer: MEDICARE

## 2023-09-12 NOTE — TELEPHONE ENCOUNTER
Patient called stating that he would like to proceed with the surgery of Right Olecranon Bursectomy.     I reached out to the patient to get him scheduled for surgery, but he didn't answer.     I left a voice mail for him to call the office back.

## 2023-09-12 NOTE — TELEPHONE ENCOUNTER
Patient called back.     I called the patient back and got him scheduled for 10/3/23 for surgery, and 09/18/23 for pre-op.     The patient verbalized a clear understanding.

## 2023-09-18 ENCOUNTER — OFFICE VISIT (OUTPATIENT)
Dept: ORTHOPEDICS | Facility: CLINIC | Age: 78
End: 2023-09-18
Payer: MEDICARE

## 2023-09-18 ENCOUNTER — TELEPHONE (OUTPATIENT)
Dept: PREADMISSION TESTING | Facility: HOSPITAL | Age: 78
End: 2023-09-18
Payer: MEDICARE

## 2023-09-18 ENCOUNTER — CLINICAL SUPPORT (OUTPATIENT)
Dept: LAB | Facility: HOSPITAL | Age: 78
End: 2023-09-18
Attending: SPECIALIST
Payer: MEDICARE

## 2023-09-18 VITALS
HEART RATE: 57 BPM | BODY MASS INDEX: 29.35 KG/M2 | DIASTOLIC BLOOD PRESSURE: 75 MMHG | WEIGHT: 205 LBS | HEIGHT: 70 IN | SYSTOLIC BLOOD PRESSURE: 132 MMHG

## 2023-09-18 DIAGNOSIS — M70.21 OLECRANON BURSITIS OF RIGHT ELBOW: Primary | ICD-10-CM

## 2023-09-18 DIAGNOSIS — M70.21 OLECRANON BURSITIS OF RIGHT ELBOW: ICD-10-CM

## 2023-09-18 DIAGNOSIS — Z01.812 PRE-OPERATIVE LABORATORY EXAMINATION: ICD-10-CM

## 2023-09-18 PROCEDURE — 99214 OFFICE O/P EST MOD 30 MIN: CPT | Mod: ,,, | Performed by: PHYSICIAN ASSISTANT

## 2023-09-18 PROCEDURE — 99214 PR OFFICE/OUTPT VISIT, EST, LEVL IV, 30-39 MIN: ICD-10-PCS | Mod: ,,, | Performed by: PHYSICIAN ASSISTANT

## 2023-09-18 PROCEDURE — 93005 ELECTROCARDIOGRAM TRACING: CPT

## 2023-09-18 RX ORDER — SODIUM CHLORIDE, SODIUM GLUCONATE, SODIUM ACETATE, POTASSIUM CHLORIDE AND MAGNESIUM CHLORIDE 30; 37; 368; 526; 502 MG/100ML; MG/100ML; MG/100ML; MG/100ML; MG/100ML
INJECTION, SOLUTION INTRAVENOUS CONTINUOUS
Status: CANCELLED | OUTPATIENT
Start: 2023-09-18

## 2023-09-18 RX ORDER — SCOLOPAMINE TRANSDERMAL SYSTEM 1 MG/1
1 PATCH, EXTENDED RELEASE TRANSDERMAL ONCE AS NEEDED
Status: CANCELLED | OUTPATIENT
Start: 2023-09-18 | End: 2035-02-14

## 2023-09-18 RX ORDER — ACETAMINOPHEN 500 MG
1000 TABLET ORAL
Status: CANCELLED | OUTPATIENT
Start: 2023-09-18

## 2023-09-18 RX ORDER — GABAPENTIN 100 MG/1
300 CAPSULE ORAL
Status: CANCELLED | OUTPATIENT
Start: 2023-09-18

## 2023-09-18 NOTE — TELEPHONE ENCOUNTER
Could you please get in touch with Mr. Jacky Jr?  He does not know when his appointment to sign his consent.  And, he doesn't know if he needs any lab work, etc prior to surgery.Thanks

## 2023-09-18 NOTE — LETTER
Date:2023  Re:Brown Rico Jr.    :1945  Dr. Navarro,    The above named patient is scheduled to have a Right Olecranon Bursectomy on 10/03/2023.    *Type of Anesthesia: General    This patient needs surgical clearance from your office for this procedure. Please perform necessary tests in order for this patient to be medically cleared for surgery. Please send or fax the clearance letter with most recent ECHO, EKG and stress test, to our office as soon as possible. Our fax number is [phone].    We appreciate your help in getting our patient cleared for surgery. Please feel free to call our office should you have any questions, [phone].        Thank you,    Dr. Efrain Barnett MD   /frank

## 2023-09-18 NOTE — H&P
Chief Complaint:   Chief Complaint   Patient presents with    Right Elbow - Pre-op Exam     Pre-op for right olecranon bursectomy 10/3/23       History of present illness:    78-year-old right-hand dominant male presents today for preoperative exam for right olecranon bursectomy on October 3rd.  No new complaints or concerns today.  The patient has continued olecranon bursitis.  He is undergone 2 aspirations but the swelling returns.  He does have a history of atrial fibrillation and currently on Eliquis.  He is ready to undergo the operation    Past Medical History:   Diagnosis Date    Afib     High cholesterol     Hypertension     Sleep apnea     cpap    Thyroid disease        Past Surgical History:   Procedure Laterality Date    CARPAL TUNNEL RELEASE Bilateral     different times    CHOLECYSTECTOMY      COLONOSCOPY      KNEE SURGERY         Current Outpatient Medications   Medication Sig    acetaminophen (TYLENOL) 325 MG tablet Take 650 mg by mouth as needed.    clindamycin (CLEOCIN) 300 MG capsule 1 capsule.    clobetasoL (TEMOVATE) 0.05 % cream Apply topically as needed.    colestipoL (COLESTID) 1 gram Tab Take 1 g by mouth as needed.    dabigatran etexilate (PRADAXA) 150 mg Cap Take 1 capsule by mouth 2 (two) times daily.    diltiaZEM (TIAZAC) 240 MG Cs24 Take 240 mg by mouth once daily.    ELIQUIS 5 mg Tab Take 5 mg by mouth 2 (two) times daily.    finasteride (PROSCAR) 5 mg tablet Take 1 tablet by mouth once daily.    flecainide (TAMBOCOR) 100 MG Tab Take 1 tablet by mouth 2 (two) times daily.    levothyroxine (SYNTHROID) 150 MCG tablet Take 1 tablet by mouth once daily.    methylPREDNISolone (MEDROL DOSEPACK) 4 mg tablet use as directed    pravastatin (PRAVACHOL) 40 MG tablet Take 1 tablet by mouth every evening.    predniSONE (DELTASONE) 20 MG tablet Take 20 mg by mouth.    tadalafiL (CIALIS) 20 MG Tab 1 tablet.    tamsulosin (FLOMAX) 0.4 mg Cap Take 1 capsule by mouth once daily.     No current  "facility-administered medications for this visit.       Review of patient's allergies indicates:  No Known Allergies    Family History   Problem Relation Age of Onset    No Known Problems Mother     No Known Problems Father     No Known Problems Sister     No Known Problems Brother     No Known Problems Maternal Aunt     No Known Problems Maternal Uncle     No Known Problems Paternal Aunt     No Known Problems Paternal Uncle     No Known Problems Maternal Grandmother     No Known Problems Maternal Grandfather     No Known Problems Paternal Grandmother     No Known Problems Paternal Grandfather     No Known Problems Other     Anesthesia problems Neg Hx     Broken bones Neg Hx     Cancer Neg Hx     Clotting disorder Neg Hx     Collagen disease Neg Hx     Diabetes Neg Hx     Dislocations Neg Hx     Osteoporosis Neg Hx     Rheumatologic disease Neg Hx     Scoliosis Neg Hx     Severe sprains Neg Hx        Social History     Socioeconomic History    Marital status:    Tobacco Use    Smoking status: Never    Smokeless tobacco: Never   Substance and Sexual Activity    Alcohol use: Not Currently    Drug use: Never    Sexual activity: Yes     Partners: Female         Review of Systems:    Constitution:   Denies chills, fever, and sweats.  HENT:   Denies headaches or blurry vision.  Cardiovascular:  Denies chest pain or irregular heart beat.  Respiratory:   Denies cough or shortness of breath.  Gastrointestinal:  Denies abdominal pain, nausea, or vomiting.  Musculoskeletal:   Denies muscle cramps.  Neurological:   Denies dizziness or focal weakness.  Psychiatric/Behavior: Normal mental status.  Hematology/Lymph:  Denies bleeding problem or easy bruising/bleeding.  Skin:    Denies rash or suspicious lesions.    Examination:    Vital Signs:    Vitals:    09/18/23 1420   BP: 132/75   Pulse: (!) 57   Weight: 93 kg (205 lb)   Height: 5' 10" (1.778 m)       Body mass index is 29.41 kg/m².    Constitution:   Well-developed, " well nourished patient in no acute distress.  Neurological:   Alert and oriented x 3 and cooperative to examination.     Psychiatric/Behavior: Normal mental status.  Respiratory:   No shortness of breath.  Nonlabored breathing  Cardiovascular:           Regular rate and rhythm  Eyes:    Extraoccular muscles intact  Skin:    No scars, rash or suspicious lesions.    Physical Exam:     Right elbow exam:  Moderate swelling at olecranon bursa without erythema  No joint effusion  No tenderness over medial or lateral elbow  Full elbow ROM without pain  No instability  NVI distally              Assessment:     Olecranon bursitis of right elbow    Pre-operative laboratory examination        Plan:    Right elbow olecranon bursectomy   Patient has a history of atrial fibrillation and currently on Eliquis.  We will hold this preoperatively and obtain cardiac clearance    The proposed procedure as well as associated risks/benefits were discussed at length with the patient and family with risks to include pain, bleeding, infection, future surgeries, neurovascular compromise, loss of limb, heart attack, stroke, deep vein thrombosis, and even death. They understand and agree with the treatment plan.            No follow-ups on file.    DISCLAIMER: This note may have been dictated using voice recognition software and may contain grammatical errors.

## 2023-09-29 ENCOUNTER — ANESTHESIA EVENT (OUTPATIENT)
Dept: SURGERY | Facility: HOSPITAL | Age: 78
End: 2023-09-29
Payer: MEDICARE

## 2023-10-02 RX ORDER — GABAPENTIN 300 MG/1
300 CAPSULE ORAL
Status: CANCELLED | OUTPATIENT
Start: 2023-10-02

## 2023-10-02 NOTE — ANESTHESIA PREPROCEDURE EVALUATION
10/02/2023  Brown Rico Jr. is a 78 y.o., male with ----------------------------  Afib  High cholesterol  Hypertension  Sleep apnea      Comment:  cpap  Thyroid disease    And ----------------------------  Carpal tunnel release      Comment:  different times  Cholecystectomy  Colonoscopy  Knee surgery    Presents for right elbow bursectomy .  Pt sees Vera for afib - may have watchman procedure in future.  Has been stable with meds these last couple of years     Latest Reference Range & Units 09/18/23 15:11   Sodium 136 - 145 mmol/L 143   Potassium 3.5 - 5.1 mmol/L 4.2   Chloride 98 - 107 mmol/L 109 (H)   CO2 23 - 31 mmol/L 23   BUN 8.4 - 25.7 mg/dL 17.4   Creatinine 0.73 - 1.18 mg/dL 0.79   eGFR mls/min/1.73/m2 >60   Glucose 82 - 115 mg/dL 107   (H): Data is abnormally high  Pre-op Assessment    I have reviewed the NPO Status.      Review of Systems  Cardiovascular:   Hypertension    Pulmonary:   Sleep Apnea    Endocrine:   Denies Diabetes.        Physical Exam  General: Well nourished, Cooperative, Alert and Oriented    Airway:  Mallampati: II   Mouth Opening: Normal  TM Distance: Normal  Tongue: Normal  Neck ROM: Normal ROM    Dental:  Intact, Prominent Incisors    Chest/Lungs:  Clear to auscultation, Normal Respiratory Rate    Heart:  Rate: Normal  Rhythm: Irregularly Irregular       Latest Reference Range & Units 09/18/23 15:11   Sodium 136 - 145 mmol/L 143   Potassium 3.5 - 5.1 mmol/L 4.2   Chloride 98 - 107 mmol/L 109 (H)   CO2 23 - 31 mmol/L 23   BUN 8.4 - 25.7 mg/dL 17.4   Creatinine 0.73 - 1.18 mg/dL 0.79   eGFR mls/min/1.73/m2 >60   Glucose 82 - 115 mg/dL 107   (H): Data is abnormally high    Anesthesia Plan  Type of Anesthesia, risks & benefits discussed:    Anesthesia Type: Regional, Gen Supraglottic Airway  Intra-op Monitoring Plan: Standard ASA Monitors  Post Op Pain Control Plan:  multimodal analgesia, peripheral nerve block and IV/PO Opioids PRN  Induction:  IV  Informed Consent: Informed consent signed with the Patient and all parties understand the risks and agree with anesthesia plan.  All questions answered.   ASA Score: 3  Day of Surgery Review of History & Physical: H&P Update referred to the surgeon/provider.  Anesthesia Plan Notes: Premedication: Midazolam  Regional: Supraclavicular vs interscalene nerve block for postop pain control - Ropiv 0.5% 30mls  Special Technique: Preemptive analgesia with zofran, acetaminophen and celebrex or tramadol  Nasal Cannula vs O2 via facemask- consider Supernova Nasal CPAP for obese or MORALES patients  PONV prophylaxis  LMA  PACU    Ready For Surgery From Anesthesia Perspective.     .

## 2023-10-03 ENCOUNTER — HOSPITAL ENCOUNTER (OUTPATIENT)
Facility: HOSPITAL | Age: 78
Discharge: HOME | End: 2023-10-03
Attending: SPECIALIST | Admitting: SPECIALIST
Payer: MEDICARE

## 2023-10-03 ENCOUNTER — ANESTHESIA (OUTPATIENT)
Dept: SURGERY | Facility: HOSPITAL | Age: 78
End: 2023-10-03
Payer: MEDICARE

## 2023-10-03 VITALS
DIASTOLIC BLOOD PRESSURE: 63 MMHG | OXYGEN SATURATION: 94 % | WEIGHT: 201.75 LBS | RESPIRATION RATE: 20 BRPM | BODY MASS INDEX: 28.24 KG/M2 | HEART RATE: 46 BPM | SYSTOLIC BLOOD PRESSURE: 105 MMHG | TEMPERATURE: 97 F | HEIGHT: 71 IN

## 2023-10-03 DIAGNOSIS — Z01.812 PRE-OPERATIVE LABORATORY EXAMINATION: ICD-10-CM

## 2023-10-03 DIAGNOSIS — M70.21 OLECRANON BURSITIS OF RIGHT ELBOW: ICD-10-CM

## 2023-10-03 PROCEDURE — 88304 TISSUE EXAM BY PATHOLOGIST: CPT | Performed by: SPECIALIST

## 2023-10-03 PROCEDURE — 71000016 HC POSTOP RECOV ADDL HR: Performed by: SPECIALIST

## 2023-10-03 PROCEDURE — 71000015 HC POSTOP RECOV 1ST HR: Performed by: SPECIALIST

## 2023-10-03 PROCEDURE — 63600175 PHARM REV CODE 636 W HCPCS: Performed by: ANESTHESIOLOGY

## 2023-10-03 PROCEDURE — 24105 PR REMOVAL OF ELBOW BURSA: ICD-10-PCS | Mod: RT,,, | Performed by: SPECIALIST

## 2023-10-03 PROCEDURE — D9220A PRA ANESTHESIA: ICD-10-PCS | Mod: CRNA,,, | Performed by: NURSE ANESTHETIST, CERTIFIED REGISTERED

## 2023-10-03 PROCEDURE — 63600175 PHARM REV CODE 636 W HCPCS

## 2023-10-03 PROCEDURE — 25000003 PHARM REV CODE 250: Performed by: PHYSICIAN ASSISTANT

## 2023-10-03 PROCEDURE — 25000003 PHARM REV CODE 250: Performed by: NURSE ANESTHETIST, CERTIFIED REGISTERED

## 2023-10-03 PROCEDURE — 25000003 PHARM REV CODE 250

## 2023-10-03 PROCEDURE — D9220A PRA ANESTHESIA: Mod: ANES,,, | Performed by: ANESTHESIOLOGY

## 2023-10-03 PROCEDURE — D9220A PRA ANESTHESIA: Mod: CRNA,,, | Performed by: NURSE ANESTHETIST, CERTIFIED REGISTERED

## 2023-10-03 PROCEDURE — 25000003 PHARM REV CODE 250: Performed by: ANESTHESIOLOGY

## 2023-10-03 PROCEDURE — 63600175 PHARM REV CODE 636 W HCPCS: Performed by: NURSE ANESTHETIST, CERTIFIED REGISTERED

## 2023-10-03 PROCEDURE — 24105 EXCISION OLECRANON BURSA: CPT | Mod: RT,,, | Performed by: SPECIALIST

## 2023-10-03 PROCEDURE — 64415 NJX AA&/STRD BRCH PLXS IMG: CPT | Mod: 59 | Performed by: ANESTHESIOLOGY

## 2023-10-03 PROCEDURE — 36000707: Performed by: SPECIALIST

## 2023-10-03 PROCEDURE — D9220A PRA ANESTHESIA: ICD-10-PCS | Mod: ANES,,, | Performed by: ANESTHESIOLOGY

## 2023-10-03 PROCEDURE — 37000008 HC ANESTHESIA 1ST 15 MINUTES: Performed by: SPECIALIST

## 2023-10-03 PROCEDURE — 37000009 HC ANESTHESIA EA ADD 15 MINS: Performed by: SPECIALIST

## 2023-10-03 PROCEDURE — 71000033 HC RECOVERY, INTIAL HOUR: Performed by: SPECIALIST

## 2023-10-03 PROCEDURE — 36000706: Performed by: SPECIALIST

## 2023-10-03 PROCEDURE — 64415 PERIPHERAL BLOCK: ICD-10-PCS | Mod: 59,RT,, | Performed by: ANESTHESIOLOGY

## 2023-10-03 RX ORDER — SODIUM CHLORIDE 9 MG/ML
INJECTION, SOLUTION INTRAVENOUS CONTINUOUS
Status: CANCELLED | OUTPATIENT
Start: 2023-10-03

## 2023-10-03 RX ORDER — MAG HYDROX/ALUMINUM HYD/SIMETH 200-200-20
30 SUSPENSION, ORAL (FINAL DOSE FORM) ORAL EVERY 6 HOURS PRN
Status: DISCONTINUED | OUTPATIENT
Start: 2023-10-03 | End: 2023-10-03 | Stop reason: HOSPADM

## 2023-10-03 RX ORDER — METOCLOPRAMIDE HYDROCHLORIDE 5 MG/ML
10 INJECTION INTRAMUSCULAR; INTRAVENOUS EVERY 6 HOURS PRN
Status: CANCELLED | OUTPATIENT
Start: 2023-10-03

## 2023-10-03 RX ORDER — GABAPENTIN 300 MG/1
300 CAPSULE ORAL
Status: COMPLETED | OUTPATIENT
Start: 2023-10-03 | End: 2023-10-03

## 2023-10-03 RX ORDER — MIDAZOLAM HYDROCHLORIDE 1 MG/ML
2 INJECTION INTRAMUSCULAR; INTRAVENOUS
Status: DISCONTINUED | OUTPATIENT
Start: 2023-10-03 | End: 2023-10-03 | Stop reason: HOSPADM

## 2023-10-03 RX ORDER — CEFADROXIL 500 MG/1
500 CAPSULE ORAL EVERY 12 HOURS
Qty: 14 CAPSULE | Refills: 0 | Status: SHIPPED | OUTPATIENT
Start: 2023-10-03 | End: 2023-10-10

## 2023-10-03 RX ORDER — ROPIVACAINE HYDROCHLORIDE 5 MG/ML
INJECTION, SOLUTION EPIDURAL; INFILTRATION; PERINEURAL
Status: DISCONTINUED | OUTPATIENT
Start: 2023-10-03 | End: 2023-10-03

## 2023-10-03 RX ORDER — BUPIVACAINE HYDROCHLORIDE AND EPINEPHRINE 2.5; 5 MG/ML; UG/ML
INJECTION, SOLUTION EPIDURAL; INFILTRATION; INTRACAUDAL; PERINEURAL
Status: DISCONTINUED
Start: 2023-10-03 | End: 2023-10-03 | Stop reason: HOSPADM

## 2023-10-03 RX ORDER — PROCHLORPERAZINE EDISYLATE 5 MG/ML
5 INJECTION INTRAMUSCULAR; INTRAVENOUS EVERY 30 MIN PRN
Status: DISCONTINUED | OUTPATIENT
Start: 2023-10-03 | End: 2023-10-03 | Stop reason: HOSPADM

## 2023-10-03 RX ORDER — MIDAZOLAM HYDROCHLORIDE 1 MG/ML
INJECTION INTRAMUSCULAR; INTRAVENOUS
Status: DISCONTINUED | OUTPATIENT
Start: 2023-10-03 | End: 2023-10-03

## 2023-10-03 RX ORDER — ROPIVACAINE HYDROCHLORIDE 5 MG/ML
30 INJECTION, SOLUTION EPIDURAL; INFILTRATION; PERINEURAL ONCE
Status: DISCONTINUED | OUTPATIENT
Start: 2023-10-03 | End: 2023-10-03 | Stop reason: HOSPADM

## 2023-10-03 RX ORDER — LIDOCAINE HYDROCHLORIDE 10 MG/ML
INJECTION, SOLUTION EPIDURAL; INFILTRATION; INTRACAUDAL; PERINEURAL
Status: DISCONTINUED | OUTPATIENT
Start: 2023-10-03 | End: 2023-10-03

## 2023-10-03 RX ORDER — HYDROCODONE BITARTRATE AND ACETAMINOPHEN 5; 325 MG/1; MG/1
1 TABLET ORAL
Status: DISCONTINUED | OUTPATIENT
Start: 2023-10-03 | End: 2023-10-03 | Stop reason: HOSPADM

## 2023-10-03 RX ORDER — SCOLOPAMINE TRANSDERMAL SYSTEM 1 MG/1
1 PATCH, EXTENDED RELEASE TRANSDERMAL ONCE AS NEEDED
Status: DISCONTINUED | OUTPATIENT
Start: 2023-10-03 | End: 2023-10-03 | Stop reason: HOSPADM

## 2023-10-03 RX ORDER — SODIUM CHLORIDE, SODIUM LACTATE, POTASSIUM CHLORIDE, CALCIUM CHLORIDE 600; 310; 30; 20 MG/100ML; MG/100ML; MG/100ML; MG/100ML
1000 INJECTION, SOLUTION INTRAVENOUS ONCE
Status: DISCONTINUED | OUTPATIENT
Start: 2023-10-03 | End: 2023-10-03 | Stop reason: HOSPADM

## 2023-10-03 RX ORDER — ONDANSETRON 2 MG/ML
4 INJECTION INTRAMUSCULAR; INTRAVENOUS DAILY PRN
Status: DISCONTINUED | OUTPATIENT
Start: 2023-10-03 | End: 2023-10-03 | Stop reason: HOSPADM

## 2023-10-03 RX ORDER — CELECOXIB 200 MG/1
200 CAPSULE ORAL
Status: DISCONTINUED | OUTPATIENT
Start: 2023-10-03 | End: 2023-10-03 | Stop reason: HOSPADM

## 2023-10-03 RX ORDER — MIDAZOLAM HYDROCHLORIDE 1 MG/ML
.5-4 INJECTION INTRAMUSCULAR; INTRAVENOUS
Status: DISCONTINUED | OUTPATIENT
Start: 2023-10-03 | End: 2023-10-03 | Stop reason: HOSPADM

## 2023-10-03 RX ORDER — HYDROMORPHONE HYDROCHLORIDE 2 MG/ML
0.4 INJECTION, SOLUTION INTRAMUSCULAR; INTRAVENOUS; SUBCUTANEOUS EVERY 5 MIN PRN
Status: DISCONTINUED | OUTPATIENT
Start: 2023-10-03 | End: 2023-10-03 | Stop reason: HOSPADM

## 2023-10-03 RX ORDER — HYDROCODONE BITARTRATE AND ACETAMINOPHEN 5; 325 MG/1; MG/1
1 TABLET ORAL EVERY 4 HOURS PRN
Status: CANCELLED | OUTPATIENT
Start: 2023-10-03

## 2023-10-03 RX ORDER — ACETAMINOPHEN 500 MG
1000 TABLET ORAL
Status: COMPLETED | OUTPATIENT
Start: 2023-10-03 | End: 2023-10-03

## 2023-10-03 RX ORDER — ACETAMINOPHEN 500 MG
1000 TABLET ORAL
Status: DISCONTINUED | OUTPATIENT
Start: 2023-10-03 | End: 2023-10-03 | Stop reason: HOSPADM

## 2023-10-03 RX ORDER — ONDANSETRON 2 MG/ML
4 INJECTION INTRAMUSCULAR; INTRAVENOUS EVERY 6 HOURS PRN
Status: CANCELLED | OUTPATIENT
Start: 2023-10-03

## 2023-10-03 RX ORDER — ONDANSETRON 4 MG/1
4 TABLET, ORALLY DISINTEGRATING ORAL
Status: COMPLETED | OUTPATIENT
Start: 2023-10-03 | End: 2023-10-03

## 2023-10-03 RX ORDER — MIDAZOLAM HYDROCHLORIDE 1 MG/ML
INJECTION INTRAMUSCULAR; INTRAVENOUS
Status: COMPLETED
Start: 2023-10-03 | End: 2023-10-03

## 2023-10-03 RX ORDER — KETAMINE HYDROCHLORIDE 100 MG/ML
INJECTION, SOLUTION INTRAMUSCULAR; INTRAVENOUS
Status: DISCONTINUED | OUTPATIENT
Start: 2023-10-03 | End: 2023-10-03

## 2023-10-03 RX ORDER — MORPHINE SULFATE 4 MG/ML
4 INJECTION, SOLUTION INTRAMUSCULAR; INTRAVENOUS
Status: CANCELLED | OUTPATIENT
Start: 2023-10-03

## 2023-10-03 RX ORDER — SODIUM CHLORIDE, SODIUM GLUCONATE, SODIUM ACETATE, POTASSIUM CHLORIDE AND MAGNESIUM CHLORIDE 30; 37; 368; 526; 502 MG/100ML; MG/100ML; MG/100ML; MG/100ML; MG/100ML
INJECTION, SOLUTION INTRAVENOUS CONTINUOUS
Status: DISCONTINUED | OUTPATIENT
Start: 2023-10-03 | End: 2023-10-03 | Stop reason: HOSPADM

## 2023-10-03 RX ORDER — HYDROCODONE BITARTRATE AND ACETAMINOPHEN 5; 325 MG/1; MG/1
1 TABLET ORAL 3 TIMES DAILY
Qty: 15 TABLET | Refills: 0 | Status: SHIPPED | OUTPATIENT
Start: 2023-10-03 | End: 2023-10-08

## 2023-10-03 RX ORDER — PROPOFOL 10 MG/ML
VIAL (ML) INTRAVENOUS CONTINUOUS PRN
Status: DISCONTINUED | OUTPATIENT
Start: 2023-10-03 | End: 2023-10-03

## 2023-10-03 RX ORDER — FENTANYL CITRATE 50 UG/ML
25-200 INJECTION, SOLUTION INTRAMUSCULAR; INTRAVENOUS
Status: DISCONTINUED | OUTPATIENT
Start: 2023-10-03 | End: 2023-10-03 | Stop reason: HOSPADM

## 2023-10-03 RX ORDER — MEPERIDINE HYDROCHLORIDE 25 MG/ML
6.25 INJECTION INTRAMUSCULAR; INTRAVENOUS; SUBCUTANEOUS ONCE AS NEEDED
Status: DISCONTINUED | OUTPATIENT
Start: 2023-10-03 | End: 2023-10-03 | Stop reason: HOSPADM

## 2023-10-03 RX ORDER — DEXAMETHASONE SODIUM PHOSPHATE 4 MG/ML
INJECTION, SOLUTION INTRA-ARTICULAR; INTRALESIONAL; INTRAMUSCULAR; INTRAVENOUS; SOFT TISSUE
Status: DISCONTINUED | OUTPATIENT
Start: 2023-10-03 | End: 2023-10-03

## 2023-10-03 RX ORDER — METHOCARBAMOL 500 MG/1
500 TABLET, FILM COATED ORAL EVERY 6 HOURS PRN
Status: CANCELLED | OUTPATIENT
Start: 2023-10-03

## 2023-10-03 RX ORDER — GLYCOPYRROLATE 0.2 MG/ML
INJECTION INTRAMUSCULAR; INTRAVENOUS
Status: DISCONTINUED | OUTPATIENT
Start: 2023-10-03 | End: 2023-10-03

## 2023-10-03 RX ADMIN — PROPOFOL 50 MCG/KG/MIN: 10 INJECTION, EMULSION INTRAVENOUS at 07:10

## 2023-10-03 RX ADMIN — GABAPENTIN 300 MG: 300 CAPSULE ORAL at 06:10

## 2023-10-03 RX ADMIN — PROPOFOL 60 MG: 10 INJECTION, EMULSION INTRAVENOUS at 08:10

## 2023-10-03 RX ADMIN — GLYCOPYRROLATE 0.2 MG: 0.2 INJECTION INTRAMUSCULAR; INTRAVENOUS at 08:10

## 2023-10-03 RX ADMIN — MIDAZOLAM HYDROCHLORIDE 2 MG: 1 INJECTION INTRAMUSCULAR; INTRAVENOUS at 07:10

## 2023-10-03 RX ADMIN — ACETAMINOPHEN 1000 MG: 500 TABLET ORAL at 06:10

## 2023-10-03 RX ADMIN — ONDANSETRON 4 MG: 4 TABLET, ORALLY DISINTEGRATING ORAL at 06:10

## 2023-10-03 RX ADMIN — HYDROMORPHONE HYDROCHLORIDE 0.4 MG: 2 INJECTION, SOLUTION INTRAMUSCULAR; INTRAVENOUS; SUBCUTANEOUS at 08:10

## 2023-10-03 RX ADMIN — MIDAZOLAM 2 MG: 1 INJECTION INTRAMUSCULAR; INTRAVENOUS at 08:10

## 2023-10-03 RX ADMIN — KETAMINE HYDROCHLORIDE 10 MG: 100 INJECTION, SOLUTION INTRAMUSCULAR; INTRAVENOUS at 08:10

## 2023-10-03 RX ADMIN — SODIUM CHLORIDE, SODIUM GLUCONATE, SODIUM ACETATE, POTASSIUM CHLORIDE AND MAGNESIUM CHLORIDE: 526; 502; 368; 37; 30 INJECTION, SOLUTION INTRAVENOUS at 07:10

## 2023-10-03 RX ADMIN — MIDAZOLAM 2 MG: 1 INJECTION INTRAMUSCULAR; INTRAVENOUS at 07:10

## 2023-10-03 RX ADMIN — CELECOXIB 200 MG: 200 CAPSULE ORAL at 06:10

## 2023-10-03 RX ADMIN — DEXAMETHASONE SODIUM PHOSPHATE 4 MG: 4 INJECTION, SOLUTION INTRA-ARTICULAR; INTRALESIONAL; INTRAMUSCULAR; INTRAVENOUS; SOFT TISSUE at 08:10

## 2023-10-03 RX ADMIN — HYDROMORPHONE HYDROCHLORIDE 0.4 MG: 2 INJECTION, SOLUTION INTRAMUSCULAR; INTRAVENOUS; SUBCUTANEOUS at 09:10

## 2023-10-03 RX ADMIN — LIDOCAINE HYDROCHLORIDE 3 ML: 10 INJECTION, SOLUTION EPIDURAL; INFILTRATION; INTRACAUDAL; PERINEURAL at 07:10

## 2023-10-03 RX ADMIN — ROPIVACAINE HYDROCHLORIDE 30 ML: 5 INJECTION, SOLUTION EPIDURAL; INFILTRATION; PERINEURAL at 07:10

## 2023-10-03 NOTE — ANESTHESIA PROCEDURE NOTES
Peripheral Block    Patient location during procedure: pre-op   Block not for primary anesthetic.  Reason for block: at surgeon's request and post-op pain management   Post-op Pain Location: Right elbow   Start time: 10/3/2023 7:11 AM  Timeout: 10/3/2023 7:10 AM   End time: 10/3/2023 7:12 AM    Staffing  Authorizing Provider: Clarissa Reyes MD  Performing Provider: Clarissa Reyes MD    Staffing  Performed by: Clarissa Reyes MD  Authorized by: Clarissa Reyes MD    Preanesthetic Checklist  Completed: patient identified, IV checked, site marked, risks and benefits discussed, surgical consent, monitors and equipment checked, pre-op evaluation and timeout performed  Peripheral Block  Patient position: supine  Prep: ChloraPrep  Patient monitoring: heart rate, cardiac monitor, continuous pulse ox, continuous capnometry and frequent blood pressure checks  Block type: supraclavicular  Laterality: right  Injection technique: single shot  Needle  Needle type: Stimuplex   Needle gauge: 22 G  Needle length: 2 in  Needle localization: anatomical landmarks and ultrasound guidance   -ultrasound image captured on disc.  Assessment  Injection assessment: negative aspiration, negative parasthesia and local visualized surrounding nerve  Paresthesia pain: none  Heart rate change: no  Slow fractionated injection: yes  Pain Tolerance: comfortable throughout block and no complaints  Medications:    Medications: ropivacaine (NAROPIN) injection 0.5% - Perineural   30 mL - 10/3/2023 7:12:00 AM    Additional Notes  VSS.  RN monitoring vitals throughout procedure.  Patient tolerated procedure well.      IV Sedation used and titrated for patient comfort-See MAR  Ultrasound guidance used to visualize the nerve bundle and sheath as well as confirm needle placement and deposition of the local anesthetic.  (1) Under ultrasound guidance, needle was inserted and placed in close proximity to the nerve bundle  (2) Ultrasound was also  used to visualize the spread of the anesthetic in close proximity to the brachial plexus  (3) The nerves appeared anatomically normal  (4) There were no apparent abnormal pathological findings    Ultrasound photos archived.  Pt tolerated procedure well. There were no immediate complications.

## 2023-10-03 NOTE — TRANSFER OF CARE
"Anesthesia Transfer of Care Note    Patient: Brown Rioc Jr.    Procedure(s) Performed: Procedure(s) (LRB):  BURSECTOMY, OLECRANON (Right)    Patient location: PACU    Anesthesia Type: general    Transport from OR: Transported from OR on room air with adequate spontaneous ventilation    Post pain: adequate analgesia    Post assessment: no apparent anesthetic complications    Post vital signs: stable    Level of consciousness: awake and sedated    Nausea/Vomiting: no nausea/vomiting    Complications: none    Transfer of care protocol was followed      Last vitals:   Visit Vitals  /76   Pulse (!) 55   Temp 35.6 °C (96.1 °F) (Tympanic)   Resp 20   Ht 5' 11" (1.803 m)   Wt 91.5 kg (201 lb 11.5 oz)   SpO2 (!) 93%   BMI 28.13 kg/m²     "

## 2023-10-03 NOTE — OP NOTE
[facility] - Periop Services  General Surgery  Operative Note    SUMMARY     Date of Procedure: 10/3/2023     Procedure: Procedure(s) (LRB):  BURSECTOMY, OLECRANON (Right)       Surgeon(s) and Role:     * Sky Barnett MD - Primary    First assistant:  Haider Castro, certified physician assistant who was necessary and essential for all aspects of the operation including patient positioning, surgical exposure, bursectomy, and wound closure.    Pre-Operative Diagnosis: Olecranon bursitis of right elbow [M70.21]    Post-Operative Diagnosis: Post-Op Diagnosis Codes:     * Olecranon bursitis of right elbow [M70.21]    Anesthesia: General/Regional    Operative Findings (including complications, if any):  Chronic hemorrhagic olecranon bursitis right elbow    Description of Technical Procedures:  The patient is a 78-year-old male with chronic hemorrhagic olecranon bursitis right elbow.  Risks, benefits, alternatives, and complications of operative and nonoperative treatment were explained.  He understood, agreed, and wanted to proceed with the operation.  Valid informed consent was obtained.  He was brought to the operating room placed supine on the operating table and underwent anesthesia.  Usual sterile ChloraPrep scrub and paint followed by sterile draping was performed.  Ioban dressing was placed.  Esmarch bandage used a the right upper extremity.  Tourniquet was inflated.  Elbow was flexed and a posterior midline incision over the olecranon bursa was performed.  Skin bleeders were coagulated with cautery.  I then dissected around the olecranon bursa that was swollen with old blood.  There was no evidence of infection.  I dissected the bursa off of the olecranon.  Tourniquet was deflated.  Hemostasis was obtained.  There was no abnormal bleeding.  The wound was irrigated suctioned closed with Vicryl and nylon suture.  Sterile dressings were applied.  Patient was placed in a sling and taken to  recovery room in stable condition.  He will be discharged home with follow-up in 2 weeks for suture removal.       Estimated Blood Loss (EBL): * No values recorded between 10/3/2023  8:03 AM and 10/3/2023  8:20 AM *           Implants: * No implants in log *    Specimens:   Specimen (24h ago, onward)      None                    Condition: Good    Disposition: PACU - hemodynamically stable.

## 2023-10-03 NOTE — ANESTHESIA POSTPROCEDURE EVALUATION
Anesthesia Post Evaluation    Patient: Brown Rico Jr.    Procedure(s) Performed: Procedure(s) (LRB):  BURSECTOMY, OLECRANON (Right)    Final Anesthesia Type: general      Patient location during evaluation: PACU  Patient participation: Yes- Able to Participate  Level of consciousness: awake and alert  Post-procedure vital signs: reviewed and stable  Pain management: adequate (preop block working well)  Airway patency: patent    PONV status at discharge: No PONV  Anesthetic complications: no      Cardiovascular status: hemodynamically stable  Respiratory status: unassisted  Hydration status: euvolemic  Follow-up not needed.          Vitals Value Taken Time   /63 10/03/23 1031   Temp 36.1 °C (97 °F) 10/03/23 0920   Pulse 47 10/03/23 1035   Resp 20 10/03/23 1030   SpO2 93 % 10/03/23 1035   Vitals shown include unvalidated device data.      Event Time   Out of Recovery 09:21:00         Pain/Jaden Score: Pain Rating Prior to Med Admin: 6 (10/3/2023  9:00 AM)  Jaden Score: 10 (10/3/2023 10:50 AM)  Modified Jaden Score: 20 (10/3/2023 10:50 AM)

## 2023-10-03 NOTE — ANESTHESIA PROCEDURE NOTES
Intubation    Date/Time: 10/3/2023 8:08 AM    Performed by: Austin Bellamy CRNA  Authorized by: Clarissa Reyes MD    Intubation:     Induction:  Intravenous    Mask Ventilation:  Easy mask    Attempts:  1    Attempted By:  CRNA    Difficult Airway Encountered?: No      Complications:  None    Airway Device:  Supraglottic airway/LMA    Airway Device Size:  4.0    Style/Cuff Inflation:  Cuffed (inflated to minimal occlusive pressure)    Secured at:  The lips    Placement Verified By:  Capnometry    Complicating Factors:  None    Findings Post-Intubation:  BS equal bilateral

## 2023-10-03 NOTE — DISCHARGE SUMMARY
[facility] Orthopa[facility] - Periop Services  Discharge Note  Short Stay    Procedure(s) (LRB):  BURSECTOMY, OLECRANON (Right)      OUTCOME: Patient tolerated treatment/procedure well without complication and is now ready for discharge.    DISPOSITION: Home or Self Care    FINAL DIAGNOSIS:  <principal problem not specified>    FOLLOWUP: In clinic    DISCHARGE INSTRUCTIONS:    Discharge Procedure Orders   Diet general     Activity as tolerated     Keep surgical extremity elevated     Ice to affected area     No driving, operating heavy equipment or signing legal documents while taking pain medication.     Wound care routine (specify)   Order Comments: Wound care routine:  Keep surgical dressing clean dry and intact until seen in office     Call MD for:  temperature >100.4     Call MD for:  persistent nausea and vomiting     Call MD for:  severe uncontrolled pain     Call MD for:  difficulty breathing, headache or visual disturbances     Call MD for:  redness, tenderness, or signs of infection (pain, swelling, redness, odor or green/yellow discharge around incision site)     Call MD for:  hives     Call MD for:  persistent dizziness or light-headedness     Call MD for:  extreme fatigue        TIME SPENT ON DISCHARGE: 10 minutes

## 2023-10-05 LAB — VIEW PATHOLOGY REPORT (RELIAPATH): NORMAL

## 2023-10-18 ENCOUNTER — OFFICE VISIT (OUTPATIENT)
Dept: ORTHOPEDICS | Facility: CLINIC | Age: 78
End: 2023-10-18
Payer: MEDICARE

## 2023-10-18 DIAGNOSIS — M70.21 OLECRANON BURSITIS OF RIGHT ELBOW: Primary | ICD-10-CM

## 2023-10-18 PROCEDURE — 99024 PR POST-OP FOLLOW-UP VISIT: ICD-10-PCS | Mod: POP,,, | Performed by: PHYSICIAN ASSISTANT

## 2023-10-18 PROCEDURE — 99024 POSTOP FOLLOW-UP VISIT: CPT | Mod: POP,,, | Performed by: PHYSICIAN ASSISTANT

## 2023-10-30 NOTE — PROGRESS NOTES
Chief Complaint:   Chief Complaint   Patient presents with    Post-op Evaluation     2 wk s/p R elbow bursectomy sx 10/3/23-1/1/24. State he has been doing well. No complaints today.        History of present illness:    This is a 78 y.o. year old male who complains of right elbow olecranon bursectomy.    Patient is doing well here today for a postop visit      Current Outpatient Medications   Medication Sig    acetaminophen (TYLENOL) 325 MG tablet Take 650 mg by mouth as needed.    clindamycin (CLEOCIN) 300 MG capsule 1 capsule.    clobetasoL (TEMOVATE) 0.05 % cream Apply topically as needed.    colestipoL (COLESTID) 1 gram Tab Take 1 g by mouth as needed.    dabigatran etexilate (PRADAXA) 150 mg Cap Take 1 capsule by mouth 2 (two) times daily.    diltiaZEM (TIAZAC) 240 MG Cs24 Take 240 mg by mouth once daily.    ELIQUIS 5 mg Tab Take 5 mg by mouth 2 (two) times daily.    finasteride (PROSCAR) 5 mg tablet Take 1 tablet by mouth once daily.    flecainide (TAMBOCOR) 100 MG Tab Take 1 tablet by mouth 2 (two) times daily.    levothyroxine (SYNTHROID) 150 MCG tablet Take 1 tablet by mouth once daily.    pravastatin (PRAVACHOL) 40 MG tablet Take 1 tablet by mouth every evening.    predniSONE (DELTASONE) 20 MG tablet Take 20 mg by mouth.    tadalafiL (CIALIS) 20 MG Tab 1 tablet.    tamsulosin (FLOMAX) 0.4 mg Cap Take 1 capsule by mouth once daily.    methylPREDNISolone (MEDROL DOSEPACK) 4 mg tablet use as directed (Patient not taking: Reported on 10/18/2023)     No current facility-administered medications for this visit.       Review of Systems:    Constitution:   Denies chills, fever, and sweats.  HENT:   Denies headaches or blurry vision.  Cardiovascular:  Denies chest pain or irregular heart beat.  Respiratory:   Denies cough or shortness of breath.  Gastrointestinal:  Denies abdominal pain, nausea, or vomiting.  Musculoskeletal:   Denies muscle cramps.  Neurological:   Denies dizziness or focal  weakness.  Psychiatric/Behavior: Normal mental status.  Hematology/Lymph:  Denies bleeding problem or easy bruising/bleeding.  Skin:    Denies rash or suspicious lesions.    Examination:    Vital Signs:  There were no vitals filed for this visit.    There is no height or weight on file to calculate BMI.    Constitution:   Well-developed, well nourished patient in no acute distress.  Neurological:   Alert and oriented x 3 and cooperative to examination.     Psychiatric/Behavior: Normal mental status.  Respiratory:   No shortness of breath.  Eyes:    Extraoccular muscles intact  Skin:    No scars, rash or suspicious lesions.    Physical Exam:   Elbow Exam:  Right  No obvious deformity.   ROM 0 to 130 degrees.   Negative varus and valgus stress test.   Supination and pronation to 90 degrees.   No tenderness to palpation over the lateral epicondyle.   No tenderness to palpation over the medial epicondyle.   Negative Tinel´s test.   No olecranon tenderness.   5/5 strength, normal skin appearance and palpable pulses distally.   Sensibility normal.  Wound healing well with the sutures intact.    Olecranon bursa is flat        Assessment: Olecranon bursitis of right elbow         Plan:  This point the patient has a sutures removed and wound care and dressing change instructions discussed with the patient   He was going to refrain and reduced physical activity for the next 2-4 weeks.    He will follow up in 4 weeks           DISCLAIMER: This note may have been dictated using voice recognition software and may contain grammatical errors.     NOTE: Consult report sent to referring provider via [facility] [facility] EMR.

## 2023-11-29 ENCOUNTER — OFFICE VISIT (OUTPATIENT)
Dept: ORTHOPEDICS | Facility: CLINIC | Age: 78
End: 2023-11-29
Payer: MEDICARE

## 2023-11-29 VITALS
BODY MASS INDEX: 29.54 KG/M2 | WEIGHT: 211 LBS | HEART RATE: 57 BPM | HEIGHT: 71 IN | SYSTOLIC BLOOD PRESSURE: 133 MMHG | DIASTOLIC BLOOD PRESSURE: 74 MMHG

## 2023-11-29 DIAGNOSIS — M70.21 OLECRANON BURSITIS OF RIGHT ELBOW: Primary | ICD-10-CM

## 2023-11-29 PROCEDURE — 99024 PR POST-OP FOLLOW-UP VISIT: ICD-10-PCS | Mod: POP,,, | Performed by: SPECIALIST

## 2023-11-29 PROCEDURE — 99024 POSTOP FOLLOW-UP VISIT: CPT | Mod: POP,,, | Performed by: SPECIALIST

## 2023-11-29 NOTE — PROGRESS NOTES
Past Medical History:   Diagnosis Date    Afib     High cholesterol     Hypertension     Sleep apnea     cpap    Thyroid disease        Past Surgical History:   Procedure Laterality Date    CARPAL TUNNEL RELEASE Bilateral     different times    CHOLECYSTECTOMY      COLONOSCOPY      KNEE SURGERY      OLECRANON BURSECTOMY Right 10/3/2023    Procedure: BURSECTOMY, OLECRANON;  Surgeon: Sky Barnett MD;  Location: [facility];  Service: Orthopedics;  Laterality: Right;       Current Outpatient Medications   Medication Sig    acetaminophen (TYLENOL) 325 MG tablet Take 650 mg by mouth as needed.    clindamycin (CLEOCIN) 300 MG capsule 1 capsule.    clobetasoL (TEMOVATE) 0.05 % cream Apply topically as needed.    colestipoL (COLESTID) 1 gram Tab Take 1 g by mouth as needed.    dabigatran etexilate (PRADAXA) 150 mg Cap Take 1 capsule by mouth 2 (two) times daily.    diltiaZEM (TIAZAC) 240 MG Cs24 Take 240 mg by mouth once daily.    ELIQUIS 5 mg Tab Take 5 mg by mouth 2 (two) times daily.    finasteride (PROSCAR) 5 mg tablet Take 1 tablet by mouth once daily.    flecainide (TAMBOCOR) 100 MG Tab Take 1 tablet by mouth 2 (two) times daily.    levothyroxine (SYNTHROID) 150 MCG tablet Take 1 tablet by mouth once daily.    methylPREDNISolone (MEDROL DOSEPACK) 4 mg tablet use as directed    pravastatin (PRAVACHOL) 40 MG tablet Take 1 tablet by mouth every evening.    predniSONE (DELTASONE) 20 MG tablet Take 20 mg by mouth.    tadalafiL (CIALIS) 20 MG Tab 1 tablet.    tamsulosin (FLOMAX) 0.4 mg Cap Take 1 capsule by mouth once daily.     No current facility-administered medications for this visit.       Review of patient's allergies indicates:  No Known Allergies    Family History   Problem Relation Age of Onset    No Known Problems Mother     No Known Problems Father     No Known Problems Sister     No Known Problems Brother     No Known Problems Maternal Aunt     No Known Problems Maternal Uncle     No Known Problems Paternal Aunt   "   No Known Problems Paternal Uncle     No Known Problems Maternal Grandmother     No Known Problems Maternal Grandfather     No Known Problems Paternal Grandmother     No Known Problems Paternal Grandfather     No Known Problems Other     Anesthesia problems Neg Hx     Broken bones Neg Hx     Cancer Neg Hx     Clotting disorder Neg Hx     Collagen disease Neg Hx     Diabetes Neg Hx     Dislocations Neg Hx     Osteoporosis Neg Hx     Rheumatologic disease Neg Hx     Scoliosis Neg Hx     Severe sprains Neg Hx        Social History     Socioeconomic History    Marital status:    Tobacco Use    Smoking status: Never    Smokeless tobacco: Never   Substance and Sexual Activity    Alcohol use: Not Currently    Drug use: Never    Sexual activity: Yes     Partners: Female       Chief Complaint:   Chief Complaint   Patient presents with    Right Elbow - Follow-up     Pt states he is doing great after his surgery. Pt denies pain or discomfort in his elbow. Pt is very satisfied with his surgery.       Consulting Physician: No ref. provider found    History of present illness:    This is a 78 y.o. year old male who is doing great postop from olecranon bursectomy of the right elbow.  He has no pain or tenderness or swelling.    Review of Systems:    Constitution:   Denies chills, fever, and sweats.  HENT:   Denies headaches or blurry vision.  Cardiovascular:  Denies chest pain or irregular heart beat.  Respiratory:   Denies cough or shortness of breath.  Gastrointestinal:  Denies abdominal pain, nausea, or vomiting.  Musculoskeletal:   Denies muscle cramps.  Neurological:   Denies dizziness or focal weakness.  Psychiatric/Behavior: Normal mental status.  Hematology/Lymph:  Denies bleeding problem or easy bruising/bleeding.  Skin:    Denies rash or suspicious lesions.    Examination:    Vital Signs:    Vitals:    11/29/23 1110   BP: 133/74   Pulse: (!) 57   Weight: 95.7 kg (211 lb)   Height: 5' 11" (1.803 m)       Body " mass index is 29.43 kg/m².    Constitution:   Well-developed, well nourished patient in no acute distress.  Neurological:   Alert and oriented x 3 and cooperative to examination.     Psychiatric/Behavior: Normal mental status.  Respiratory:   No shortness of breath.non labored breathing.  Cardiovascular: Regular rate and rhythm  Eyes:    Extraoccular muscles intact  Skin:    No scars, rash or suspicious lesions.    Physical Exam:  Right elbow exam:   No swelling, no redness, no increased heat  Well-healed remodeled posterior midline surgical scar over the olecranon   No swelling or tenderness   Full range of motion   Neurovascularly intact          Assessment: Olecranon bursitis of right elbow        Plan:  Activities as tolerated follow up p.r.n.      DISCLAIMER: This note may have been dictated using voice recognition software and may contain grammatical errors.     NOTE: Consult report sent to referring provider via [email or web address] EMR.

## (undated) DEVICE — KIT SURGICAL TURNOVER

## (undated) DEVICE — DRAPE INCISE IOBAN 2 13X13IN

## (undated) DEVICE — PAD ABD 8X10 STERILE

## (undated) DEVICE — BANDAGE VELCLOSE ELAS 4INX5YD

## (undated) DEVICE — STOCKINETTE IMPERVIOUS LARGE

## (undated) DEVICE — SOL NACL IRR 1000ML BTL

## (undated) DEVICE — ELECTRODE NEEDLE 2.8IN

## (undated) DEVICE — ELECTRODE PATIENT RETURN DISP

## (undated) DEVICE — GLOVE SENSICARE PI GRN 8

## (undated) DEVICE — DRAPE HAND STERILE

## (undated) DEVICE — Device

## (undated) DEVICE — DRESSING ADAPTIC TOUCH 3X4

## (undated) DEVICE — GOWN POLY REINF X-LONG 2XL

## (undated) DEVICE — DRAPE STERI U-SHAPED 47X51IN

## (undated) DEVICE — GLOVE SENSICARE PI MICRO 8

## (undated) DEVICE — CUFF ATS 2 PORT SNGL BLDR 18IN

## (undated) DEVICE — DRESSING XEROFORM 5X9IN

## (undated) DEVICE — APPLICATOR CHLORAPREP ORN 26ML

## (undated) DEVICE — BANDAGE ACE DOUBLE STER 6IN

## (undated) DEVICE — PAD ABDOMINAL 5X9 STERILE

## (undated) DEVICE — PAD CAST SPECIALIST STRL 6

## (undated) DEVICE — SUT BLK MONO 3-0 CUT 18IN F

## (undated) DEVICE — SUT 2-0 VICRYL FS-1 UND

## (undated) DEVICE — DRESSING N ADH OIL EMUL 3X3